# Patient Record
Sex: FEMALE | Race: WHITE | Employment: OTHER | URBAN - METROPOLITAN AREA
[De-identification: names, ages, dates, MRNs, and addresses within clinical notes are randomized per-mention and may not be internally consistent; named-entity substitution may affect disease eponyms.]

---

## 2017-01-06 RX ORDER — POTASSIUM CHLORIDE 20 MEQ/1
60 TABLET, EXTENDED RELEASE ORAL DAILY
Qty: 90 TAB | Refills: 6 | Status: SHIPPED | OUTPATIENT
Start: 2017-01-06 | End: 2017-12-28 | Stop reason: SDUPTHER

## 2017-02-01 ENCOUNTER — OFFICE VISIT (OUTPATIENT)
Dept: CARDIOLOGY CLINIC | Age: 57
End: 2017-02-01

## 2017-02-01 DIAGNOSIS — I44.2 COMPLETE HEART BLOCK (HCC): Primary | ICD-10-CM

## 2017-02-01 DIAGNOSIS — Z95.0 PACEMAKER: ICD-10-CM

## 2017-02-06 NOTE — PROGRESS NOTES
I have personally seen and evaluated the device findings. Interrogation reviewed and I agree with assessment.     Nataliia Knows

## 2017-03-21 ENCOUNTER — OFFICE VISIT (OUTPATIENT)
Dept: CARDIOLOGY CLINIC | Age: 57
End: 2017-03-21

## 2017-03-21 VITALS
BODY MASS INDEX: 29.26 KG/M2 | HEART RATE: 75 BPM | SYSTOLIC BLOOD PRESSURE: 128 MMHG | DIASTOLIC BLOOD PRESSURE: 60 MMHG | HEIGHT: 62 IN | WEIGHT: 159 LBS | OXYGEN SATURATION: 95 %

## 2017-03-21 DIAGNOSIS — I35.0 SEVERE AORTIC STENOSIS: Primary | ICD-10-CM

## 2017-03-21 DIAGNOSIS — R60.0 BILATERAL LOWER EXTREMITY EDEMA: ICD-10-CM

## 2017-03-21 DIAGNOSIS — I48.20 CHRONIC ATRIAL FIBRILLATION (HCC): ICD-10-CM

## 2017-03-21 DIAGNOSIS — I27.20 PULMONARY HYPERTENSION (HCC): ICD-10-CM

## 2017-03-21 DIAGNOSIS — I50.32 CHRONIC DIASTOLIC HEART FAILURE (HCC): ICD-10-CM

## 2017-03-21 DIAGNOSIS — I25.10 CAD IN NATIVE ARTERY: ICD-10-CM

## 2017-03-21 DIAGNOSIS — Z95.0 PACEMAKER: ICD-10-CM

## 2017-03-21 RX ORDER — ASPIRIN 81 MG/1
81 TABLET ORAL
COMMUNITY

## 2017-03-21 NOTE — PROGRESS NOTES
HISTORY OF PRESENT ILLNESS  Efren Sanchez is a 55 y.o. female. HPI  She has been feeling quite well lately. She has no cardiac complaints at this time. She has only minimal dyspnea on exertion. She denies chest pain, resting dyspnea, orthopnea or PND. She has had no palpitations despite the atrial fibrillation. She denies dizziness or syncope. She has had no symptoms to indicate TIA or amaurosis fugax. She had a followup echocardiogram on 02/21/2017, which demonstrated normal LV function with EF in the 75% range. The left atrium was dilated with a volume index of 37 mL/meter2. The bioprosthetic aortic valve appears to be functioning normally with the mean gradient of 14 mmHg. There appears to be no significant perivalvular leak. PA pressure was estimated at 49 mmHg. She has an extensive cardiac history. She had her first aortic valve replacement with the bioprosthetic aortic valve in July of 2006. 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 She subsequently underwent redo aortic valve replacement by TAVR successfully. Her repeat echocardiogram in June of 2016 demonstrated a normal LV functioning bioprosthetic aortic valve with a mean gradient of 16 mmHg and no evidence of significant paravalvular leak. She is known to have had diastolic dysfunction of the left ventricle, which was documented by cardiac catheterization with a high LVEDP. Her coronary artery system was patent with minor diffuse changes with no obstructive lesion by cardiac catheterization. She has developed severe pulmonary hypertension, which apparently preceded the aortic valve replacement. PA pressure was up to 90 mmHg in the past, which decreased to 50 mmHg on 06/28/16 by echocardiogram, at which time, the ejection fraction of the left ventricle was in the 60% range.  The left atrium was moderately dilated and the right atrium was dilated as well. There was mild annular calcification of the mitral valve with mild regurgitation. The mean pressure gradient over the aortic valve was 17 mmHg. There was mild to moderate tricuspid regurgitation. She has had chronic atrial fibrillation and had significant bradycardia and underwent pacemaker implantation in 2013 because of the complete heart block. She also has an extensive noncardiac history that includes malignant melanoma that was treated with surgery, chemotherapy and radiation therapy in 2004, which resulted in lymphedema in the left arm. She is known to have had restrictive lung disease of unknown etiology. She has had hypertension, diabetes and severe dyslipidemia with extremely high triglycerides, low HDL and high total cholesterol. Cholesterol level was 304 with LDL of 39 and triglycerides of 521 in May of 2016. She has some history of renal dysfunction with over-diuresis in the past.    Review of Systems   Constitutional: Negative for malaise/fatigue and weight loss. HENT: Negative for hearing loss. Eyes: Negative for blurred vision and double vision. Respiratory: Negative for shortness of breath. Cardiovascular: Positive for leg swelling. Negative for chest pain, palpitations, orthopnea, claudication and PND. Gastrointestinal: Negative for blood in stool, heartburn and melena. Genitourinary: Negative for dysuria, hematuria and urgency. Musculoskeletal: Negative for back pain and joint pain. Skin: Negative for itching and rash. Neurological: Negative for dizziness, loss of consciousness, weakness and headaches. Psychiatric/Behavioral: Negative for depression and memory loss. Physical Exam   Constitutional: She is oriented to person, place, and time. She appears well-developed and well-nourished. HENT:   Head: Normocephalic and atraumatic. Eyes: Conjunctivae are normal. Pupils are equal, round, and reactive to light. Neck: Normal range of motion. Neck supple. No JVD present. Cardiovascular: Normal rate, regular rhythm, S1 normal and S2 normal.   No extrasystoles are present. PMI is not displaced. Exam reveals no gallop and no friction rub. Murmur heard. Harsh early systolic murmur is present with a grade of 2/6  at the upper right sternal border radiating to the neck  Pulses:       Carotid pulses are 3+ on the right side with bruit, and 3+ on the left side with bruit. Pulmonary/Chest: Effort normal. She has no rales. Abdominal: Soft. There is no tenderness. Musculoskeletal: She exhibits edema. Lymphedema,no blisters   Neurological: She is alert and oriented to person, place, and time. No cranial nerve deficit. Skin: Skin is warm and dry. Psychiatric: She has a normal mood and affect. Her behavior is normal.     Visit Vitals    /60    Pulse 75    Ht 5' 2\" (1.575 m)    Wt 72.1 kg (159 lb)    SpO2 95%    BMI 29.08 kg/m2       Past Medical History:   Diagnosis Date    Aortic stenosis     #23 bioprosthetic AVR 7/06    Atrial fibrillation ([facility])     CHADS score 2  (-CHF, +HTN, -AGE, +DM, -CVA)    CAD in native artery     cor angio (Jan 2016): 50% pLAD, LCx luminal irreg, 30% mRCA. pLAD MLA 12.7 (IVUS)    Chronic diastolic HF (heart failure) ([facility])     LV EF 55% (echo Nov 2015)    Complete heart block [facility])     periprocedural 7/06    Diabetes     Dyslipidemia     Femoral arteriovenous fistula, right ([facility])     R side with initially ? pseudoaneurysm, none on last PVL (Feb 11, 2016 @ [facility])    History of echocardiogram 10/13/2015    EF 55%. No WMA. Mild LVH. Indeterminate diastolic function. Dyssynergic septal motion.  RVE. RV systolic fx reduced, more in apical region. RVSP 60-70 mmHg (prev 50 mmHg on study of 3/19/14). Mod LAE.  MIC. Mild MR. Bioprosthetic AV w/mild-mod AS and mild-mod AI (mean grad 28 mmHg). Mod TR. Mild-mod PI. Mod IVCE. Poor inspiratory collapse.     History of exercise stress test 02/15/2006 Nondiagnostic stress test due to inability to attain diagnostic HR. Ex time 1 min 43 sec.  Hypertension     Hypothyroidism     Malignant melanoma ([address]     initial '95, recurrent '04, L supraclavicullar/axillary resection '04,    Malignant melanoma ([facility]     neck reconstruction, L rectus free tissue transfer '04    Malignant melanoma ([address]     chemo, XRT    Obstructive sleep apnea     severe, CPAP    Pacemaker     7/06 MEDTRONIC    Pulmonary hypertension ([facility]     cath (2006, prior to AVR): RA 20, RV 86/10, PA 83/33, PCWP 36. CO 3.3 (Zach)    Restrictive lung disease     moderate (PFTs 11/04)    S/P cardiac cath 03/06/2006    Patent coronary arteries. LVEDP 32 mmHg. EF 50-55%. No WMA. RA 20.  RV 86/10. PA 83/33. W 36.  CO 3.3 (Donn Kappa). Severe AS. Severe pulm hypertension.  SVT     periprocedural herniorrhaphy 10/04       Social History     Social History    Marital status:      Spouse name: N/A    Number of children: N/A    Years of education: N/A     Occupational History    Not on file.      Social History Main Topics    Smoking status: Never Smoker    Smokeless tobacco: Never Used    Alcohol use Yes      Comment: rarely    Drug use: Not on file    Sexual activity: Not on file     Other Topics Concern    Not on file     Social History Narrative       Family History   Problem Relation Age of Onset    Elevated Lipids Mother     Heart Disease Mother     Diabetes Father     Heart Disease Father     Elevated Lipids Father     Hypertension Father     Cancer Father     Heart Disease Brother        Past Surgical History:   Procedure Laterality Date    HX AORTIC VALVE REPLACEMENT      #23 pericardial 7/06    HX HERNIA REPAIR      10/04 ventral herniorrhaphy    HX PACEMAKER      7/06    ND RADICAL RESEC TUMOR, NECK/CHEST      melanoma resection       Current Outpatient Prescriptions   Medication Sig Dispense Refill    aspirin delayed-release 81 mg tablet 81 mg.     Juan Ness potassium chloride (K-DUR, KLOR-CON) 20 mEq tablet Take 3 Tabs by mouth daily. 90 Tab 6    atorvastatin (LIPITOR) 80 mg tablet Take 1 Tab by mouth daily. 30 Tab 6    carvedilol (COREG) 3.125 mg tablet Take 1 Tab by mouth two (2) times daily (with meals). 60 Tab 6    metOLazone (ZAROXOLYN) 5 mg tablet Take 1 Tab by mouth two (2) times a day. 60 Tab 6    omega-3 fatty acids-vitamin e (FISH OIL) 1,000 mg cap Take 3 Caps by mouth daily. 90 Cap 6    bumetanide (BUMEX) 2 mg tablet TAKE ONE & ONE-HALF TABLETS BY MOUTH TWICE DAILY 90 Tab 6    sildenafil (REVATIO) 20 mg tablet Take 1 Tab by mouth three (3) times daily. 90 Tab 6    glimepiride (AMARYL) 4 mg tablet Take 2 mg by mouth two (2) times a day.  spironolactone (ALDACTONE) 25 mg tablet Take 1 Tab by mouth two (2) times a day. 60 Tab 6    magnesium oxide (MAG-OX) 400 mg tablet Take 1 Tab by mouth three (3) times daily. 90 Tab 11    rivaroxaban (XARELTO) 15 mg tab tablet Take  by mouth daily.  levothyroxine (SYNTHROID) 125 mcg tablet Take 1 Tab by mouth daily (before breakfast). 1 Tab 0       EKG: unchanged from previous tracings, atrial fibrillation, rate controlled, 1:1 VVI pacer activities  . ASSESSMENT and PLAN  Encounter Diagnoses   Name Primary?  Severe aortic stenosis, S/P AVR 2006. Redo Left TF TAVR with a Medtronic CoreValve Evolut R 26 mm 2/16/16 Yes    Pulmonary hypertension (HCC)     Chronic diastolic heart failure (HCC)     Chronic atrial fibrillation (HCC)     Pacemaker     Chronic bilateral lower extremity edema     CAD in native artery,nonobstructive, 50% prox LAD    She has been doing quite well. Her bioprosthetic aortic valve appears to be functioning normally. Her recent echocardiogram demonstrated normal functioning aortic valve with no evidence of perivalvular leak. PA pressure remains at around 50 mmHg, which is an improvement from 90 mmHg prior to the TAVR.   The etiology of her aortic stenosis at the beginning was presumably secondary to radiation for the melanoma or possibly congenital.  She does have mild nonobstructive coronary artery disease and this needs to be followed closely. She needs to work on risk factor modification. I do not have her most recent cholesterol profile and this needs to be followed closely in the future, but currently, she appears to be on an adequate dose of atorvastatin at 80 mg daily. Her atrial fibrillation has been stable with good rate control and currently, she is 100% pacemaker dependent. She will be continued on Xarelto since her atrial fibrillation could be considered nonvalvular atrial fibrillation since the mitral valve is not the cause of her atrial fibrillation.

## 2017-03-21 NOTE — MR AVS SNAPSHOT
Visit Information Date & Time Provider Department Dept. Phone Encounter #  
 3/21/2017  1:40 PM Vick Wolff MD [facility] [phone] [id number] Your Appointments 5/11/2017  9:15 AM  
PROCEDURE with Pacer Toby Quezadai [facility] ([address]) Appt Note: annual  
 [address] [place] [address] [phone] [address] [place] [address] [address] Upcoming Health Maintenance Date Due Hepatitis C Screening 1960 FOOT EXAM Q1 6/12/1971 EYE EXAM RETINAL OR DILATED Q1 6/12/1971 Pneumococcal 19-64 Highest Risk (1 of 3 - PCV13) 6/12/1980 DTaP/Tdap/Td series (1 - Tdap) 6/12/1982 PAP AKA CERVICAL CYTOLOGY 6/12/1982 BREAST CANCER SCRN MAMMOGRAM 6/12/2011 FOBT Q 1 YEAR AGE 50-75 6/12/2011 INFLUENZA AGE 9 TO ADULT 8/1/2016 HEMOGLOBIN A1C Q6M 11/23/2016 MICROALBUMIN Q1 5/23/2017 LIPID PANEL Q1 5/23/2017 Allergies as of 3/21/2017  Review Complete On: 3/21/2017 By: Vick Wolff MD  
  
 Severity Noted Reaction Type Reactions Adhesive  04/06/2016    Itching Pradaxa [Dabigatran Etexilate]    Other (comments) GI distress Current Immunizations  Reviewed on 1/5/2016 No immunizations on file. Not reviewed this visit You Were Diagnosed With   
  
 Codes Comments Pulmonary hypertension ([facility]    -  Primary ICD-10-CM: I27.2 ICD-9-CM: 416.8 Chronic diastolic heart failure (HCC)     ICD-10-CM: I50.32 
ICD-9-CM: 428.32 Chronic atrial fibrillation (HCC)     ICD-10-CM: T61.2 ICD-9-CM: 427.31 Severe aortic stenosis     ICD-10-CM: I35.0 ICD-9-CM: 424.1 Pacemaker     ICD-10-CM: Z95.0 ICD-9-CM: V45.01 Bilateral lower extremity edema     ICD-10-CM: R60.0 ICD-9-CM: 035. 3 Vitals BP Pulse Height(growth percentile) Weight(growth percentile) SpO2 BMI 128/60 75 5' 2\" (1.575 m) 159 lb (72.1 kg) 95% 29.08 kg/m2 OB Status Smoking Status Postmenopausal Never Smoker BMI and BSA Data Body Mass Index Body Surface Area 29.08 kg/m 2 1.78 m 2 Preferred Pharmacy Pharmacy Name Phone [facility], [address] [phone] Your Updated Medication List  
  
   
This list is accurate as of: 3/21/17  3:13 PM.  Always use your most recent med list.  
  
  
  
  
 aspirin delayed-release 81 mg tablet 81 mg.  
  
 atorvastatin 80 mg tablet Commonly known as:  LIPITOR Take 1 Tab by mouth daily. bumetanide 2 mg tablet Commonly known as:  [place] TAKE ONE & ONE-HALF TABLETS BY MOUTH TWICE DAILY  
  
 carvedilol 3.125 mg tablet Commonly known as:  Catrachita Stacey Take 1 Tab by mouth two (2) times daily (with meals). glimepiride 4 mg tablet Commonly known as:  AMARYL Take 2 mg by mouth two (2) times a day. levothyroxine 125 mcg tablet Commonly known as:  SYNTHROID Take 1 Tab by mouth daily (before breakfast). magnesium oxide 400 mg tablet Commonly known as:  MAG-OX Take 1 Tab by mouth three (3) times daily. metOLazone 5 mg tablet Commonly known as:  [place] Take 1 Tab by mouth two (2) times a day. omega-3 fatty acids-vitamin e 1,000 mg Cap Commonly known as:  FISH OIL Take 3 Caps by mouth daily. potassium chloride 20 mEq tablet Commonly known as:  K-DUR, KLOR-CON Take 3 Tabs by mouth daily. rivaroxaban 15 mg Tab tablet Commonly known as:  Renato Antes Take  by mouth daily. sildenafil 20 mg tablet Commonly known as:  REVATIO Take 1 Tab by mouth three (3) times daily. spironolactone 25 mg tablet Commonly known as:  ALDACTONE Take 1 Tab by mouth two (2) times a day. Introducing [facility]!    
 [facility] introduces [email or web address] patient portal. Now you can access parts of your medical record, email your doctor's office, and request medication refills online. 1. In your internet browser, go to [email or web address] 2. Click on the First Time User? Click Here link in the Sign In box. You will see the New Member Sign Up page. 3. Enter your [employer] Access Code exactly as it appears below. You will not need to use this code after youve completed the sign-up process. If you do not sign up before the expiration date, you must request a new code. · [employer] Access Code: [email or web address] Expires: 6/19/2017  3:13 PM 
 
4. Enter the last four digits of your Social Security Number (xxxx) and Date of Birth (mm/dd/yyyy) as indicated and click Submit. You will be taken to the next sign-up page. 5. Create a [employer] ID. This will be your [employer] login ID and cannot be changed, so think of one that is secure and easy to remember. 6. Create a [employer] password. You can change your password at any time. 7. Enter your Password Reset Question and Answer. This can be used at a later time if you forget your password. 8. Enter your e-mail address. You will receive e-mail notification when new information is available in [address] 9. Click Sign Up. You can now view and download portions of your medical record. 10. Click the Download Summary menu link to download a portable copy of your medical information. If you have questions, please visit the Frequently Asked Questions section of the [employer] website. Remember, [employer] is NOT to be used for urgent needs. For medical emergencies, dial 911. Now available from your iPhone and Android! Please provide this summary of care documentation to your next provider. Your primary care clinician is listed as PRAKASH LOMBARDI. If you have any questions after today's visit, please call [phone].

## 2017-03-21 NOTE — PROGRESS NOTES
1. Have you been to the ER, urgent care clinic since your last visit? Hospitalized since your last visit? no  2. Have you seen or consulted any other health care providers outside of the [address] [place] [address] since your last visit? Include any pap smears or colon screening.   Dr. Fadumo Moreno 2/17/17 Echo/ekg done

## 2017-03-22 DIAGNOSIS — E78.5 DYSLIPIDEMIA: ICD-10-CM

## 2017-03-22 DIAGNOSIS — I48.20 CHRONIC ATRIAL FIBRILLATION (HCC): ICD-10-CM

## 2017-03-22 DIAGNOSIS — I50.30: ICD-10-CM

## 2017-03-22 DIAGNOSIS — I38: ICD-10-CM

## 2017-03-22 DIAGNOSIS — I25.10 CAD IN NATIVE ARTERY: Primary | ICD-10-CM

## 2017-03-22 DIAGNOSIS — E13.9 DIABETES MELLITUS OF OTHER TYPE WITHOUT COMPLICATION: ICD-10-CM

## 2017-03-30 RX ORDER — LANOLIN ALCOHOL/MO/W.PET/CERES
400 CREAM (GRAM) TOPICAL 3 TIMES DAILY
Qty: 90 TAB | Refills: 11 | Status: CANCELLED | OUTPATIENT
Start: 2017-03-30

## 2017-03-31 RX ORDER — LANOLIN ALCOHOL/MO/W.PET/CERES
400 CREAM (GRAM) TOPICAL 3 TIMES DAILY
Qty: 90 TAB | Refills: 11 | Status: SHIPPED | OUTPATIENT
Start: 2017-03-31 | End: 2017-04-03 | Stop reason: SDUPTHER

## 2017-04-03 RX ORDER — LEVOTHYROXINE SODIUM 125 UG/1
125 TABLET ORAL
Qty: 30 TAB | Refills: 0 | Status: SHIPPED | OUTPATIENT
Start: 2017-04-03 | End: 2018-05-09 | Stop reason: SDUPTHER

## 2017-04-03 RX ORDER — LANOLIN ALCOHOL/MO/W.PET/CERES
400 CREAM (GRAM) TOPICAL 3 TIMES DAILY
Qty: 90 TAB | Refills: 11 | Status: SHIPPED | OUTPATIENT
Start: 2017-04-03 | End: 2018-04-25 | Stop reason: SDUPTHER

## 2017-04-19 RX ORDER — BUMETANIDE 2 MG/1
TABLET ORAL
Qty: 90 TAB | Refills: 6 | Status: SHIPPED | OUTPATIENT
Start: 2017-04-19 | End: 2018-03-07 | Stop reason: SDUPTHER

## 2017-05-01 RX ORDER — SILDENAFIL CITRATE 20 MG/1
20 TABLET ORAL 3 TIMES DAILY
Qty: 90 TAB | Refills: 6 | Status: SHIPPED | OUTPATIENT
Start: 2017-05-01 | End: 2018-04-02 | Stop reason: SDUPTHER

## 2017-05-04 ENCOUNTER — HOSPITAL ENCOUNTER (OUTPATIENT)
Dept: LAB | Age: 57
Discharge: HOME | End: 2017-05-04
Payer: MEDICARE

## 2017-05-04 DIAGNOSIS — I25.10 CAD IN NATIVE ARTERY: ICD-10-CM

## 2017-05-04 DIAGNOSIS — I50.30: ICD-10-CM

## 2017-05-04 DIAGNOSIS — I48.20 CHRONIC ATRIAL FIBRILLATION (HCC): ICD-10-CM

## 2017-05-04 DIAGNOSIS — I38: ICD-10-CM

## 2017-05-04 DIAGNOSIS — E78.5 DYSLIPIDEMIA: ICD-10-CM

## 2017-05-04 DIAGNOSIS — E13.9 DIABETES MELLITUS OF OTHER TYPE WITHOUT COMPLICATION: ICD-10-CM

## 2017-05-04 LAB
ALBUMIN SERPL BCP-MCNC: 1.8 G/DL (ref 3.4–5)
ALBUMIN/GLOB SERPL: 0.6 {RATIO} (ref 0.8–1.7)
ALP SERPL-CCNC: 238 U/L (ref 45–117)
ALT SERPL-CCNC: 19 U/L (ref 13–56)
ANION GAP BLD CALC-SCNC: 9 MMOL/L (ref 3–18)
AST SERPL W P-5'-P-CCNC: 22 U/L (ref 15–37)
BILIRUB SERPL-MCNC: 0.5 MG/DL (ref 0.2–1)
BUN SERPL-MCNC: 47 MG/DL (ref 7–18)
BUN/CREAT SERPL: 44 (ref 12–20)
CALCIUM SERPL-MCNC: 7 MG/DL (ref 8.5–10.1)
CHLORIDE SERPL-SCNC: 97 MMOL/L (ref 100–108)
CHOLEST SERPL-MCNC: 332 MG/DL
CO2 SERPL-SCNC: 29 MMOL/L (ref 21–32)
CREAT SERPL-MCNC: 1.06 MG/DL (ref 0.6–1.3)
GLOBULIN SER CALC-MCNC: 2.9 G/DL (ref 2–4)
GLUCOSE SERPL-MCNC: 212 MG/DL (ref 74–99)
HDLC SERPL-MCNC: 40 MG/DL (ref 40–60)
HDLC SERPL: 8.3 {RATIO} (ref 0–5)
LDLC SERPL CALC-MCNC: ABNORMAL MG/DL (ref 0–100)
LIPID PROFILE,FLP: ABNORMAL
POTASSIUM SERPL-SCNC: 3.4 MMOL/L (ref 3.5–5.5)
PROT SERPL-MCNC: 4.7 G/DL (ref 6.4–8.2)
SODIUM SERPL-SCNC: 135 MMOL/L (ref 136–145)
TRIGL SERPL-MCNC: 926 MG/DL (ref ?–150)
VLDLC SERPL CALC-MCNC: ABNORMAL MG/DL

## 2017-05-04 PROCEDURE — 80053 COMPREHEN METABOLIC PANEL: CPT | Performed by: INTERNAL MEDICINE

## 2017-05-04 PROCEDURE — 80061 LIPID PANEL: CPT | Performed by: INTERNAL MEDICINE

## 2017-05-04 PROCEDURE — 36415 COLL VENOUS BLD VENIPUNCTURE: CPT | Performed by: INTERNAL MEDICINE

## 2017-05-10 DIAGNOSIS — E78.5 DYSLIPIDEMIA: Primary | ICD-10-CM

## 2017-05-23 RX ORDER — EZETIMIBE 10 MG/1
10 TABLET ORAL DAILY
Qty: 30 TAB | Refills: 6 | Status: SHIPPED | OUTPATIENT
Start: 2017-05-23 | End: 2018-11-26 | Stop reason: ALTCHOICE

## 2017-05-23 NOTE — TELEPHONE ENCOUNTER
Robinson Souza MD   You 7 days ago                 Add Elida (Routing comment)           Lm on AM for patient to call back

## 2017-06-23 RX ORDER — CARVEDILOL 3.12 MG/1
3.12 TABLET ORAL 2 TIMES DAILY WITH MEALS
Qty: 60 TAB | Refills: 6 | Status: SHIPPED | OUTPATIENT
Start: 2017-06-23 | End: 2018-01-29 | Stop reason: SDUPTHER

## 2017-06-28 DIAGNOSIS — M79.641 PAIN OF RIGHT HAND: Primary | ICD-10-CM

## 2017-06-28 NOTE — PROGRESS NOTES
Per Dr. Mike Forrest, order Uric Acid Lab for right hand pain.        Verbal order and read back per Willian Valadez MD

## 2017-06-29 ENCOUNTER — HOSPITAL ENCOUNTER (OUTPATIENT)
Dept: LAB | Age: 57
Discharge: HOME | End: 2017-06-29
Payer: MEDICARE

## 2017-06-29 DIAGNOSIS — M79.641 PAIN OF RIGHT HAND: ICD-10-CM

## 2017-06-29 LAB — URATE SERPL-MCNC: 11.5 MG/DL (ref 2.6–7.2)

## 2017-06-29 PROCEDURE — 36415 COLL VENOUS BLD VENIPUNCTURE: CPT | Performed by: INTERNAL MEDICINE

## 2017-06-29 PROCEDURE — 84550 ASSAY OF BLOOD/URIC ACID: CPT | Performed by: INTERNAL MEDICINE

## 2017-06-30 RX ORDER — ALLOPURINOL 100 MG/1
100 TABLET ORAL DAILY
Qty: 30 TAB | Refills: 6 | Status: SHIPPED | OUTPATIENT
Start: 2017-06-30 | End: 2018-01-30 | Stop reason: SDUPTHER

## 2017-06-30 RX ORDER — COLCHICINE 0.6 MG/1
0.6 TABLET ORAL DAILY
Qty: 30 TAB | Refills: 6 | Status: SHIPPED | OUTPATIENT
Start: 2017-06-30 | End: 2018-01-26 | Stop reason: SDUPTHER

## 2017-07-20 DIAGNOSIS — I11.9 BENIGN HYPERTENSIVE HEART DISEASE WITHOUT HEART FAILURE: Primary | ICD-10-CM

## 2017-07-20 RX ORDER — METOLAZONE 5 MG/1
TABLET ORAL
Qty: 60 TAB | Refills: 6 | Status: SHIPPED | OUTPATIENT
Start: 2017-07-20 | End: 2018-08-22 | Stop reason: SDUPTHER

## 2017-07-20 NOTE — TELEPHONE ENCOUNTER
Spoke with Dr. Mateo Desai. Ok to have refilled, have BMP drawn. Verbal order and read back per Reddy Nicholas MD  Pt states the soonest she can have drawn is Monday or Tuesday due to transportation issues.

## 2017-07-25 ENCOUNTER — HOSPITAL ENCOUNTER (OUTPATIENT)
Dept: LAB | Age: 57
Discharge: HOME | End: 2017-07-25
Payer: MEDICARE

## 2017-07-25 DIAGNOSIS — E78.5 DYSLIPIDEMIA: ICD-10-CM

## 2017-07-25 LAB
ALBUMIN SERPL BCP-MCNC: 2.4 G/DL (ref 3.4–5)
ALBUMIN/GLOB SERPL: 0.8 {RATIO} (ref 0.8–1.7)
ALP SERPL-CCNC: 200 U/L (ref 45–117)
ALT SERPL-CCNC: 39 U/L (ref 13–56)
AST SERPL W P-5'-P-CCNC: 31 U/L (ref 15–37)
BILIRUB DIRECT SERPL-MCNC: <0.1 MG/DL (ref 0–0.2)
BILIRUB SERPL-MCNC: 0.5 MG/DL (ref 0.2–1)
CHOLEST SERPL-MCNC: 323 MG/DL
GLOBULIN SER CALC-MCNC: 3.2 G/DL (ref 2–4)
HDLC SERPL-MCNC: 30 MG/DL (ref 40–60)
HDLC SERPL: 10.8 {RATIO} (ref 0–5)
LDLC SERPL CALC-MCNC: ABNORMAL MG/DL (ref 0–100)
LIPID PROFILE,FLP: ABNORMAL
PROT SERPL-MCNC: 5.6 G/DL (ref 6.4–8.2)
TRIGL SERPL-MCNC: 1800 MG/DL (ref ?–150)
VLDLC SERPL CALC-MCNC: ABNORMAL MG/DL

## 2017-07-25 PROCEDURE — 36415 COLL VENOUS BLD VENIPUNCTURE: CPT | Performed by: INTERNAL MEDICINE

## 2017-07-25 PROCEDURE — 80076 HEPATIC FUNCTION PANEL: CPT | Performed by: INTERNAL MEDICINE

## 2017-07-25 PROCEDURE — 80061 LIPID PANEL: CPT | Performed by: INTERNAL MEDICINE

## 2017-08-30 RX ORDER — ATORVASTATIN CALCIUM 80 MG/1
80 TABLET, FILM COATED ORAL DAILY
Qty: 30 TAB | Refills: 6 | Status: SHIPPED | OUTPATIENT
Start: 2017-08-30 | End: 2018-07-13 | Stop reason: SDUPTHER

## 2017-09-26 ENCOUNTER — CLINICAL SUPPORT (OUTPATIENT)
Dept: CARDIOLOGY CLINIC | Age: 57
End: 2017-09-26

## 2017-09-26 ENCOUNTER — OFFICE VISIT (OUTPATIENT)
Dept: CARDIOLOGY CLINIC | Age: 57
End: 2017-09-26

## 2017-09-26 VITALS
HEART RATE: 66 BPM | DIASTOLIC BLOOD PRESSURE: 62 MMHG | OXYGEN SATURATION: 94 % | BODY MASS INDEX: 27.23 KG/M2 | SYSTOLIC BLOOD PRESSURE: 98 MMHG | HEIGHT: 62 IN | WEIGHT: 148 LBS

## 2017-09-26 DIAGNOSIS — Z95.0 PACEMAKER: ICD-10-CM

## 2017-09-26 DIAGNOSIS — Z95.2 S/P AVR: Primary | ICD-10-CM

## 2017-09-26 DIAGNOSIS — I48.20 CHRONIC ATRIAL FIBRILLATION (HCC): ICD-10-CM

## 2017-09-26 DIAGNOSIS — R80.9 PROTEINURIA, UNSPECIFIED TYPE: ICD-10-CM

## 2017-09-26 DIAGNOSIS — I27.20 PULMONARY HYPERTENSION (HCC): ICD-10-CM

## 2017-09-26 DIAGNOSIS — I25.10 CAD IN NATIVE ARTERY: ICD-10-CM

## 2017-09-26 DIAGNOSIS — I44.2 COMPLETE HEART BLOCK (HCC): Primary | ICD-10-CM

## 2017-09-26 DIAGNOSIS — E78.5 DYSLIPIDEMIA: ICD-10-CM

## 2017-09-26 NOTE — PROGRESS NOTES
HISTORY OF PRESENT ILLNESS  Katt Guzman is a 65 y.o. female. HPI  Overall, she has been feeling reasonably well. She has no new complaints. She continues with massive left arm lymphedema. Her leg edema has been minimal.  She denies chest pain, resting dyspnea, orthopnea or PND. She has minimal dyspnea on exertion. She denies palpitations, dizziness or syncope. She has never felt palpitations despite the atrial fibrillation. She has had no symptoms to indicate TIA or amaurosis fugax. She has a very low serum albumin level with a level of 1.8 in May of 2017 and 2.4 in July of 2017. Her cholesterol profile in July of 2017 included a total cholesterol of 323, triglycerides of 1,800, HDL of 30 and uncalculated LDL. She has not been able to afford many medications, including Zetia. She has history of chemotherapy and extensive radiation treatment for melanoma, which may have resulted in the severe aortic stenosis in the beginning and perhaps some fibrotic lung disease as well along with the massive left arm lymphedema. She has an extensive cardiac history. She had her first aortic valve replacement with the bioprosthetic aortic valve in July of 2006. 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 She subsequently underwent redo aortic valve replacement by TAVR successfully. Her repeat echocardiogram in June of 2016 demonstrated a normal LV functioning bioprosthetic aortic valve with a mean gradient of 16 mmHg and no evidence of significant paravalvular leak. She is known to have had diastolic dysfunction of the left ventricle, which was documented by cardiac catheterization with a high LVEDP. Her coronary artery system was patent with minor diffuse changes with no obstructive lesion by cardiac catheterization.  She has developed severe pulmonary hypertension, which apparently preceded the aortic valve replacement. PA pressure was up to 90 mmHg in the past, which decreased to 50 mmHg on 06/28/16 by echocardiogram, at which time, the ejection fraction of the left ventricle was in the 60% range. The left atrium was moderately dilated and the right atrium was dilated as well. There was mild annular calcification of the mitral valve with mild regurgitation. The mean pressure gradient over the aortic valve was 17 mmHg. There was mild to moderate tricuspid regurgitation. She has had chronic atrial fibrillation and had significant bradycardia and underwent pacemaker implantation in 2013 because of the complete heart block. She also has an extensive noncardiac history that includes malignant melanoma that was treated with surgery, chemotherapy and radiation therapy in 2004, which resulted in lymphedema in the left arm. She is known to have had restrictive lung disease of unknown etiology. She has had hypertension, diabetes and severe dyslipidemia with extremely high triglycerides, low HDL and high total cholesterol. Cholesterol level was 304 with LDL of 39 and triglycerides of 521 in May of 2016. She has some history of renal dysfunction with over-diuresis in the past.  She had a followup echocardiogram on 02/21/2017, which demonstrated normal LV function with EF in the 75% range. The left atrium was dilated with a volume index of 37 mL/meter2. The bioprosthetic aortic valve appears to be functioning normally with the mean gradient of 14 mmHg. There appears to be no significant perivalvular leak. PA pressure was estimated at 49 mmHg. Review of Systems   Constitutional: Negative for malaise/fatigue and weight loss. HENT: Negative for hearing loss. Eyes: Negative for blurred vision and double vision. Respiratory: Negative for shortness of breath. Cardiovascular: Positive for leg swelling. Negative for chest pain, palpitations, claudication and PND.    Gastrointestinal: Negative for blood in stool, heartburn and melena. Genitourinary: Negative for dysuria, frequency, hematuria and urgency. Musculoskeletal: Negative for back pain and joint pain. Skin: Negative for itching and rash. Neurological: Negative for dizziness, loss of consciousness and weakness. Psychiatric/Behavioral: Negative for depression and memory loss. Physical Exam   Constitutional: She is oriented to person, place, and time. She appears well-developed and well-nourished. HENT:   Head: Normocephalic and atraumatic. Eyes: Conjunctivae are normal. Pupils are equal, round, and reactive to light. Neck: Normal range of motion. Neck supple. No JVD present. Cardiovascular: Normal rate, regular rhythm, S1 normal and S2 normal.   No extrasystoles are present. PMI is not displaced. Exam reveals no gallop and no friction rub. Murmur heard. Harsh midsystolic murmur is present with a grade of 2/6  at the upper right sternal border radiating to the neck  Pulses:       Carotid pulses are 3+ on the right side with bruit, and 3+ on the left side with bruit. Pulmonary/Chest: Effort normal. She has no rales. Abdominal: Soft. There is no tenderness. Musculoskeletal: She exhibits edema. Lymphedema in Lt. arm,trace edema in legs   Neurological: She is alert and oriented to person, place, and time. No cranial nerve deficit. Skin: Skin is warm and dry. Psychiatric: She has a normal mood and affect. Visit Vitals    BP 98/62 (BP 1 Location: Right arm, BP Patient Position: Sitting)    Pulse 66    Ht 5' 2\" (1.575 m)    Wt 67.1 kg (148 lb)    SpO2 94%    BMI 27.07 kg/m2       Past Medical History:   Diagnosis Date    Aortic stenosis     #23 bioprosthetic AVR 7/06    Atrial fibrillation (HCC)     CHADS score 2  (-CHF, +HTN, -AGE, +DM, -CVA)    CAD in native artery     cor angio (Jan 2016): 50% pLAD, LCx luminal irreg, 30% mRCA.  pLAD MLA 12.7 ([facility])    [facility] diastolic HF [facility]heart [facility])     LV EF 55% (echo Nov 2015)    Complete heart block [facility])     periprocedural 7/06    Diabetes     Dyslipidemia     Femoral arteriovenous fistula, right (HCC)     R side with initially ? pseudoaneurysm, none on last PVL (Feb 11, 2016 @ [facility])    History of echocardiogram 10/13/2015    EF 55%. No WMA. Mild LVH. Indeterminate diastolic function. Dyssynergic septal motion.  RVE. RV systolic fx reduced, more in apical region. RVSP 60-70 mmHg (prev 50 mmHg on study of 3/19/14). Mod LAE.  MIC. Mild MR. Bioprosthetic AV w/mild-mod AS and mild-mod AI (mean grad 28 mmHg). Mod TR. Mild-mod PI. Mod IVCE. Poor inspiratory collapse.  History of exercise stress test 02/15/2006    Nondiagnostic stress test due to inability to attain diagnostic HR. Ex time 1 min 43 sec.  Hypertension     Hypothyroidism     Malignant melanoma ([address]     initial '95, recurrent '04, L supraclavicullar/axillary resection '04,    Malignant melanoma ([address]     neck reconstruction, L rectus free tissue transfer '04    Malignant melanoma ([address]     chemo, XRT    Obstructive sleep apnea     severe, CPAP    Pacemaker     7/06 MEDTRONIC    Pulmonary hypertension ([address]     cath (2006, prior to AVR): RA 20, RV 86/10, PA 83/33, PCWP 36. CO 3.3 ([facility])    Restrictive lung disease     moderate (PFTs 11/04)    S/P cardiac cath 03/06/2006    Patent coronary arteries. LVEDP 32 mmHg. EF 50-55%. No WMA. RA 20.  RV 86/10. PA 83/33. W 36.  CO 3.3 (Jasson Cornea). Severe AS. Severe pulm hypertension.  SVT     periprocedural herniorrhaphy 10/04       Social History     Social History    Marital status:      Spouse name: N/A    Number of children: N/A    Years of education: N/A     Occupational History    Not on file.      Social History Main Topics    Smoking status: Never Smoker    Smokeless tobacco: Never Used    Alcohol use Yes      Comment: rarely    Drug use: Not on file    Sexual activity: Not on file     Other Topics Concern    Not on file     Social History Narrative       Family History   Problem Relation Age of Onset    Elevated Lipids Mother     Heart Disease Mother     Diabetes Father     Heart Disease Father     Elevated Lipids Father     Hypertension Father     Cancer Father     Heart Disease Brother        Past Surgical History:   Procedure Laterality Date    HX AORTIC VALVE REPLACEMENT      #23 pericardial 7/06    HX HERNIA REPAIR      10/04 ventral herniorrhaphy    HX PACEMAKER      7/06    UT RADICAL RESEC TUMOR, NECK/CHEST      melanoma resection       Current Outpatient Prescriptions   Medication Sig Dispense Refill    atorvastatin (LIPITOR) 80 mg tablet Take 1 Tab by mouth daily. 30 Tab 6    metOLazone (ZAROXOLYN) 5 mg tablet TAKE ONE TABLET BY MOUTH TWICE DAILY 60 Tab 6    allopurinol (ZYLOPRIM) 100 mg tablet Take 1 Tab by mouth daily. 30 Tab 6    colchicine 0.6 mg tablet Take 1 Tab by mouth daily. 30 Tab 6    carvedilol (COREG) 3.125 mg tablet Take 1 Tab by mouth two (2) times daily (with meals). 60 Tab 6    sildenafil (REVATIO) 20 mg tablet Take 1 Tab by mouth three (3) times daily. 90 Tab 6    bumetanide (BUMEX) 2 mg tablet TAKE ONE & ONE-HALF TABLETS BY MOUTH TWICE DAILY 90 Tab 6    levothyroxine (SYNTHROID) 125 mcg tablet Take 1 Tab by mouth Daily (before breakfast). 30 Tab 0    magnesium oxide (MAG-OX) 400 mg tablet Take 1 Tab by mouth three (3) times daily. 90 Tab 11    aspirin delayed-release 81 mg tablet 81 mg.  potassium chloride (K-DUR, KLOR-CON) 20 mEq tablet Take 3 Tabs by mouth daily. 90 Tab 6    omega-3 fatty acids-vitamin e (FISH OIL) 1,000 mg cap Take 3 Caps by mouth daily. 90 Cap 6    glimepiride (AMARYL) 4 mg tablet Take 2 mg by mouth two (2) times a day.  spironolactone (ALDACTONE) 25 mg tablet Take 1 Tab by mouth two (2) times a day. 60 Tab 6    rivaroxaban (XARELTO) 15 mg tab tablet Take  by mouth daily.  ezetimibe (ZETIA) 10 mg tablet Take 1 Tab by mouth daily.  30 Tab 6 EKG: unchanged from previous tracings, atrial fibrillation, rate controlled, 1:1 VVI pacer activities  . ASSESSMENT and PLAN  Encounter Diagnoses   Name Primary?  S/P AVR 2006. Redo Left TF TAVR with a Medtronic CoreValve Evolut R 26 mm 2/16/16 Yes    CAD in native artery     Proteinuria, unspecified type     Chronic atrial fibrillation ([address]     Pacemaker     Pulmonary hypertension ([address]     Dyslipidemia    Overall, she appears to be stable. Her bioprosthetic aortic valve appears to functioning adequately and shows no evidence of paravalvular leak. Her pulmonary hypertension has been stable with her PA pressure down to 49 mmHg in February of 2017 by echocardiogram.  It is quite possible that she developed severe aortic stenosis following the intense radiation for melanoma, which may have resulted in some fibrotic lung disease as well. She has been left with massive left arm lymphedema, which has not been resolving. It is unclear why she has a low albumin level and I will obtain a twenty-four hour urine for proteins to see if she has any significant degree of proteinuria. I will also obtain a prealbumin level to see if she has any issues with malabsorption or poor nutrition. Her lipid profile is extremely unsatisfactory with a tremendously high cholesterol level and triglycerides level. She has difficulties with obtaining medication because of the lack of drug coverage. I would like to see if she could be eligible for a drug assistance program for [facility]. Her atrial fibrillation has been persistent and chronic; therefore, I reprogrammed her pacemaker to VVI mode.

## 2017-09-26 NOTE — MR AVS SNAPSHOT
Visit Information Date & Time Provider Department Dept. Phone Encounter #  
 9/26/2017  2:00 PM Marychuy Argueta MD [facility] [phone] [id number] Upcoming Health Maintenance Date Due Hepatitis C Screening 1960 FOOT EXAM Q1 6/12/1971 EYE EXAM RETINAL OR DILATED Q1 6/12/1971 Pneumococcal 19-64 Highest Risk (1 of 3 - PCV13) 6/12/1980 DTaP/Tdap/Td series (1 - Tdap) 6/12/1982 PAP AKA CERVICAL CYTOLOGY 6/12/1982 BREAST CANCER SCRN MAMMOGRAM 6/12/2011 FOBT Q 1 YEAR AGE 50-75 6/12/2011 HEMOGLOBIN A1C Q6M 11/23/2016 MICROALBUMIN Q1 5/23/2017 INFLUENZA AGE 9 TO ADULT 8/1/2017 LIPID PANEL Q1 7/25/2018 Allergies as of 9/26/2017  Review Complete On: 9/26/2017 By: Marychuy Argueta MD  
  
 Severity Noted Reaction Type Reactions Adhesive  04/06/2016    Itching Pradaxa [Dabigatran Etexilate]    Other (comments) GI distress Current Immunizations  Reviewed on 1/5/2016 No immunizations on file. Not reviewed this visit You Were Diagnosed With   
  
 Codes Comments CAD in native artery    -  Primary ICD-10-CM: I25.10 ICD-9-CM: 414.01 Proteinuria, unspecified type     ICD-10-CM: R80.9 ICD-9-CM: 791.0 Vitals BP Pulse Height(growth percentile) Weight(growth percentile) SpO2 BMI  
 98/62 (BP 1 Location: Right arm, BP Patient Position: Sitting) 66 5' 2\" (1.575 m) 148 lb (67.1 kg) 94% 27.07 kg/m2 OB Status Smoking Status Postmenopausal Never Smoker BMI and BSA Data Body Mass Index Body Surface Area  
 27.07 kg/m 2 1.71 m 2 Preferred Pharmacy Pharmacy Name Phone [facility] [address], [address] [phone] Your Updated Medication List  
  
   
This list is accurate as of: 9/26/17  2:56 PM.  Always use your most recent med list.  
  
  
  
  
 allopurinol 100 mg tablet Commonly known as:  Perla Trujillo  
 Take 1 Tab by mouth daily. aspirin delayed-release 81 mg tablet 81 mg.  
  
 atorvastatin 80 mg tablet Commonly known as:  LIPITOR Take 1 Tab by mouth daily. bumetanide 2 mg tablet Commonly known as:  Veleria Mungo TAKE ONE & ONE-HALF TABLETS BY MOUTH TWICE DAILY  
  
 carvedilol 3.125 mg tablet Commonly known as:  Saul Dole Take 1 Tab by mouth two (2) times daily (with meals). colchicine 0.6 mg tablet Take 1 Tab by mouth daily. ezetimibe 10 mg tablet Commonly known as:  Michael Trenary Take 1 Tab by mouth daily. glimepiride 4 mg tablet Commonly known as:  AMARYL Take 2 mg by mouth two (2) times a day. levothyroxine 125 mcg tablet Commonly known as:  SYNTHROID Take 1 Tab by mouth Daily (before breakfast). magnesium oxide 400 mg tablet Commonly known as:  MAG-OX Take 1 Tab by mouth three (3) times daily. metOLazone 5 mg tablet Commonly known as:  ZAROXOLYN  
TAKE ONE TABLET BY MOUTH TWICE DAILY  
  
 omega-3 fatty acids-vitamin e 1,000 mg Cap Commonly known as:  FISH OIL Take 3 Caps by mouth daily. potassium chloride 20 mEq tablet Commonly known as:  K-DUR, KLOR-CON Take 3 Tabs by mouth daily. rivaroxaban 15 mg Tab tablet Commonly known as:  Daryn Maudlin Take  by mouth daily. sildenafil 20 mg tablet Commonly known as:  REVATIO Take 1 Tab by mouth three (3) times daily. spironolactone 25 mg tablet Commonly known as:  ALDACTONE Take 1 Tab by mouth two (2) times a day. We Performed the Following AMB POC EKG ROUTINE W/ 12 LEADS, INTER & REP [80077 CPT(R)] To-Do List   
 09/26/2017 Lab:  MICROALBUMIN, UR, 24 HR   
  
 09/26/2017 Lab:  PREALBUMIN Introducing [facility]! Carmen De La Cruz introduces [employer] patient portal. Now you can access parts of your medical record, email your doctor's office, and request medication refills online.    
 
1. In your internet browser, go to [email or web address] 2. Click on the First Time User? Click Here link in the Sign In box. You will see the New Member Sign Up page. 3. Enter your [employer] Access Code exactly as it appears below. You will not need to use this code after youve completed the sign-up process. If you do not sign up before the expiration date, you must request a new code. · [employer] Access Code: [email or web address] Expires: 12/25/2017  1:39 PM 
 
4. Enter the last four digits of your Social Security Number (xxxx) and Date of Birth (mm/dd/yyyy) as indicated and click Submit. You will be taken to the next sign-up page. 5. Create a [employer] ID. This will be your [employer] login ID and cannot be changed, so think of one that is secure and easy to remember. 6. Create a [employer] password. You can change your password at any time. 7. Enter your Password Reset Question and Answer. This can be used at a later time if you forget your password. 8. Enter your e-mail address. You will receive e-mail notification when new information is available in [address] 9. Click Sign Up. You can now view and download portions of your medical record. 10. Click the Download Summary menu link to download a portable copy of your medical information. If you have questions, please visit the Frequently Asked Questions section of the [employer] website. Remember, [employer] is NOT to be used for urgent needs. For medical emergencies, dial 911. Now available from your iPhone and Android! Please provide this summary of care documentation to your next provider. Your primary care clinician is listed as [address] [place] [address]. If you have any questions after today's visit, please call [phone].

## 2017-09-27 NOTE — PROGRESS NOTES
I have personally seen and evaluated the device findings. Interrogation reviewed and I agree with assessment.     Teri Diop

## 2017-09-28 RX ORDER — SPIRONOLACTONE 25 MG/1
25 TABLET ORAL 2 TIMES DAILY
Qty: 180 TAB | Refills: 3 | Status: SHIPPED | OUTPATIENT
Start: 2017-09-28 | End: 2018-11-02 | Stop reason: SDUPTHER

## 2017-10-02 DIAGNOSIS — I25.10 CAD IN NATIVE ARTERY: ICD-10-CM

## 2017-10-02 DIAGNOSIS — E78.5 DYSLIPIDEMIA: Primary | ICD-10-CM

## 2017-10-05 ENCOUNTER — HOSPITAL ENCOUNTER (OUTPATIENT)
Dept: LAB | Age: 57
Discharge: HOME | End: 2017-10-05
Payer: MEDICARE

## 2017-10-05 DIAGNOSIS — I25.10 CAD IN NATIVE ARTERY: ICD-10-CM

## 2017-10-05 DIAGNOSIS — E78.5 DYSLIPIDEMIA: ICD-10-CM

## 2017-10-05 DIAGNOSIS — R80.9 PROTEINURIA, UNSPECIFIED TYPE: ICD-10-CM

## 2017-10-05 LAB — PREALB SERPL-MCNC: 23.8 MG/DL (ref 20–40)

## 2017-10-05 PROCEDURE — 36415 COLL VENOUS BLD VENIPUNCTURE: CPT | Performed by: INTERNAL MEDICINE

## 2017-10-05 PROCEDURE — 83721 ASSAY OF BLOOD LIPOPROTEIN: CPT | Performed by: INTERNAL MEDICINE

## 2017-10-05 PROCEDURE — 84134 ASSAY OF PREALBUMIN: CPT | Performed by: INTERNAL MEDICINE

## 2017-10-06 LAB — LDLC SERPL DIRECT ASSAY-MCNC: 29 MG/DL (ref 0–99)

## 2017-10-06 NOTE — PROGRESS NOTES
Done per your note dated 9/26 \"It is unclear why she has a low albumin level and I will obtain a twenty-four hour urine for proteins to see if she has any significant degree of proteinuria. I will also obtain a prealbumin level to see if she has any issues with malabsorption or poor nutrition.  \"

## 2017-10-11 ENCOUNTER — TELEPHONE (OUTPATIENT)
Dept: CARDIOLOGY CLINIC | Age: 57
End: 2017-10-11

## 2017-10-11 DIAGNOSIS — I25.10 CAD IN NATIVE ARTERY: ICD-10-CM

## 2017-10-11 DIAGNOSIS — E78.5 DYSLIPIDEMIA: Primary | ICD-10-CM

## 2017-10-11 NOTE — TELEPHONE ENCOUNTER
----- Message from Any Caldera MD sent at 10/7/2017 12:02 PM EDT -----  She needs to eat more protein. Let her know. Repeat the DIRECT LDL level.  ----- Message -----     From: Arabella Hua LPN     Sent: 40/1/5667  11:28 AM       To: Any Caldera MD    Done per your note dated 9/26 \"It is unclear why she has a low albumin level and I will obtain a twenty-four hour urine for proteins to see if she has any significant degree of proteinuria. I will also obtain a prealbumin level to see if she has any issues with malabsorption or poor nutrition.  \"

## 2017-10-24 ENCOUNTER — HOSPITAL ENCOUNTER (OUTPATIENT)
Dept: LAB | Age: 57
Discharge: HOME | End: 2017-10-24
Payer: MEDICARE

## 2017-10-24 LAB
COLLECT DURATION TIME UR: 24 HR
CREAT 24H UR-MRATE: 497 MG/24HR (ref 600–2500)
MICROALBUMIN 24H UR-MRATE: 9 MG/24HR
MICROALBUMIN/CREAT 24H UR: 18 MG/G
SPECIMEN VOL ?TM UR: 1650 ML

## 2017-10-24 PROCEDURE — 82043 UR ALBUMIN QUANTITATIVE: CPT | Performed by: INTERNAL MEDICINE

## 2017-10-24 PROCEDURE — 83721 ASSAY OF BLOOD LIPOPROTEIN: CPT | Performed by: INTERNAL MEDICINE

## 2017-10-24 PROCEDURE — 36415 COLL VENOUS BLD VENIPUNCTURE: CPT | Performed by: INTERNAL MEDICINE

## 2017-10-25 ENCOUNTER — TELEPHONE (OUTPATIENT)
Dept: CARDIOLOGY CLINIC | Age: 57
End: 2017-10-25

## 2017-10-25 LAB — LDLC SERPL DIRECT ASSAY-MCNC: 40 MG/DL (ref 0–99)

## 2017-10-25 NOTE — TELEPHONE ENCOUNTER
----- Message from Lynnette Tijerina MD sent at 10/24/2017  5:35 PM EDT -----  Let her know it's normal whereas it was abnormal 7 years ago.

## 2017-12-18 RX ORDER — AZITHROMYCIN 250 MG/1
TABLET, FILM COATED ORAL
Qty: 6 TAB | Refills: 0 | Status: SHIPPED | OUTPATIENT
Start: 2017-12-18 | End: 2017-12-23

## 2017-12-28 RX ORDER — POTASSIUM CHLORIDE 20 MEQ/1
60 TABLET, EXTENDED RELEASE ORAL DAILY
Qty: 90 TAB | Refills: 6 | Status: SHIPPED | OUTPATIENT
Start: 2017-12-28 | End: 2019-01-02 | Stop reason: SDUPTHER

## 2018-01-26 RX ORDER — COLCHICINE 0.6 MG/1
0.6 TABLET ORAL DAILY
Qty: 30 TAB | Refills: 6 | Status: SHIPPED | OUTPATIENT
Start: 2018-01-26 | End: 2018-03-26 | Stop reason: SDUPTHER

## 2018-01-29 RX ORDER — CARVEDILOL 3.12 MG/1
3.12 TABLET ORAL 2 TIMES DAILY WITH MEALS
Qty: 60 TAB | Refills: 6 | Status: SHIPPED | OUTPATIENT
Start: 2018-01-29 | End: 2018-08-31 | Stop reason: SDUPTHER

## 2018-01-30 RX ORDER — ALLOPURINOL 100 MG/1
100 TABLET ORAL DAILY
Qty: 30 TAB | Refills: 6 | Status: SHIPPED | OUTPATIENT
Start: 2018-01-30 | End: 2018-09-04 | Stop reason: SDUPTHER

## 2018-02-25 RX ORDER — COLCHICINE 0.6 MG/1
TABLET ORAL
Qty: 30 TAB | Refills: 0 | Status: SHIPPED | OUTPATIENT
Start: 2018-02-25 | End: 2018-03-30 | Stop reason: SDUPTHER

## 2018-03-07 RX ORDER — BUMETANIDE 2 MG/1
TABLET ORAL
Qty: 90 TAB | Refills: 11 | Status: SHIPPED | OUTPATIENT
Start: 2018-03-07 | End: 2019-03-25 | Stop reason: SDUPTHER

## 2018-03-26 RX ORDER — COLCHICINE 0.6 MG/1
0.6 TABLET ORAL DAILY
Qty: 30 TAB | Refills: 11 | Status: SHIPPED | OUTPATIENT
Start: 2018-03-26 | End: 2018-04-24 | Stop reason: SDUPTHER

## 2018-04-01 RX ORDER — COLCHICINE 0.6 MG/1
TABLET ORAL
Qty: 30 TAB | Refills: 0 | Status: SHIPPED | OUTPATIENT
Start: 2018-04-01 | End: 2018-08-20 | Stop reason: SDUPTHER

## 2018-04-02 RX ORDER — SILDENAFIL CITRATE 20 MG/1
20 TABLET ORAL 3 TIMES DAILY
Qty: 90 TAB | Refills: 6 | Status: SHIPPED | OUTPATIENT
Start: 2018-04-02 | End: 2019-03-15 | Stop reason: SDUPTHER

## 2018-04-24 ENCOUNTER — CLINICAL SUPPORT (OUTPATIENT)
Dept: CARDIOLOGY CLINIC | Age: 58
End: 2018-04-24

## 2018-04-24 ENCOUNTER — OFFICE VISIT (OUTPATIENT)
Dept: CARDIOLOGY CLINIC | Age: 58
End: 2018-04-24

## 2018-04-24 VITALS
BODY MASS INDEX: 30.18 KG/M2 | HEART RATE: 65 BPM | WEIGHT: 164 LBS | SYSTOLIC BLOOD PRESSURE: 130 MMHG | DIASTOLIC BLOOD PRESSURE: 58 MMHG | HEIGHT: 62 IN | OXYGEN SATURATION: 94 %

## 2018-04-24 DIAGNOSIS — I48.20 CHRONIC ATRIAL FIBRILLATION (HCC): ICD-10-CM

## 2018-04-24 DIAGNOSIS — E78.5 DYSLIPIDEMIA: ICD-10-CM

## 2018-04-24 DIAGNOSIS — I27.20 PULMONARY HYPERTENSION (HCC): ICD-10-CM

## 2018-04-24 DIAGNOSIS — Z95.0 PACEMAKER: ICD-10-CM

## 2018-04-24 DIAGNOSIS — Z95.2 S/P AVR: Primary | ICD-10-CM

## 2018-04-24 DIAGNOSIS — I44.2 COMPLETE HEART BLOCK (HCC): Primary | ICD-10-CM

## 2018-04-24 DIAGNOSIS — J98.4 RESTRICTIVE LUNG DISEASE: ICD-10-CM

## 2018-04-24 NOTE — PROGRESS NOTES
HISTORY OF PRESENT ILLNESS  Wilmar Walker is a 65 y.o. female. HPI  She has been feeling quite well despite her of her complex medical issues. She denies chest pain, dyspnea, orthopnea, PND. She denies palpitation, dizziness or syncope. She denies any symptoms of TIA or amaurosis fugax. Her leg edema has been mild for which she has been taking diuretics. Her left arm lymphedema has not changed much. She has metabolic syndrome with high triglyceride and low HDL. Thus far, she has had no symptoms to indicate angina. Previous catheterization demonstrated patent coronary arteries thus far. Reptha was considered but not recommended since her diuretic LDL measurement was below 40. She has an extensive cardiac history. She had her first aortic valve replacement with the bioprosthetic aortic valve in July of 2006. 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 She subsequently underwent redo aortic valve replacement by TAVR successfully. Her repeat echocardiogram in June of 2016 demonstrated a normal LV functioning bioprosthetic aortic valve with a mean gradient of 16 mmHg and no evidence of significant paravalvular leak. She is known to have had diastolic dysfunction of the left ventricle, which was documented by cardiac catheterization with a high LVEDP. Her coronary artery system was patent with minor diffuse changes with no obstructive lesion by cardiac catheterization. She has developed severe pulmonary hypertension, which apparently preceded the aortic valve replacement. PA pressure was up to 90 mmHg in the past, which decreased to 50 mmHg on 06/28/16 by echocardiogram, at which time, the ejection fraction of the left ventricle was in the 60% range. The left atrium was moderately dilated and the right atrium was dilated as well. There was mild annular calcification of the mitral valve with mild regurgitation. The mean pressure gradient over the aortic valve was 17 mmHg. There was mild to moderate tricuspid regurgitation. She has had chronic atrial fibrillation and had significant bradycardia and underwent pacemaker implantation in 2013 because of the complete heart block. She also has an extensive noncardiac history that includes malignant melanoma that was treated with surgery, chemotherapy and radiation therapy in 2004, which resulted in lymphedema in the left arm. She is known to have had restrictive lung disease of unknown etiology. She has had hypertension, diabetes and severe dyslipidemia with extremely high triglycerides, low HDL and high total cholesterol. Cholesterol level was 304 with LDL of 39 and triglycerides of 521 in May of 2016. She has some history of renal dysfunction with over-diuresis in the past.  She had a followup echocardiogram on 02/21/2017, which demonstrated normal LV function with EF in the 75% range.  The left atrium was dilated with a volume index of 37 mL/meter2.  The bioprosthetic aortic valve appears to be functioning normally with the mean gradient of 14 mmHg.  There appears to be no significant perivalvular leak.  PA pressure was estimated at 49 mmHg. She has history of chemotherapy and extensive radiation treatment for melanoma, which may have resulted in the severe aortic stenosis in the beginning and perhaps some fibrotic lung disease as well along with the massive left arm lymphedema. Review of Systems   Constitutional: Negative for malaise/fatigue and weight loss. HENT: Negative for hearing loss. Eyes: Negative for blurred vision and double vision. Respiratory: Negative for shortness of breath. Cardiovascular: Positive for leg swelling. Negative for chest pain, palpitations, orthopnea, claudication and PND. Gastrointestinal: Negative for blood in stool, heartburn and melena. Genitourinary: Negative for dysuria, frequency, hematuria and urgency. Musculoskeletal: Negative for back pain and joint pain. Skin: Negative for itching and rash. Neurological: Negative for dizziness, loss of consciousness and weakness. Psychiatric/Behavioral: Negative for depression and memory loss. Physical Exam   Constitutional: She is oriented to person, place, and time. She appears well-developed and well-nourished. HENT:   Head: Normocephalic and atraumatic. Eyes: Conjunctivae are normal. Pupils are equal, round, and reactive to light. Neck: Normal range of motion. Neck supple. No JVD present. Cardiovascular: Normal rate, S1 normal and S2 normal.  An irregularly irregular rhythm present. No extrasystoles are present. PMI is not displaced. Exam reveals no gallop and no friction rub. Murmur heard. Harsh early systolic murmur is present with a grade of 2/6  at the upper right sternal border radiating to the neck  Pulses:       Carotid pulses are 3+ on the right side with bruit, and 3+ on the left side with bruit. Pulmonary/Chest: Effort normal. She has no rales. Abdominal: Soft. There is no tenderness. Musculoskeletal: She exhibits edema. Lt.arm lymphedema, trace leg edema   Neurological: She is alert and oriented to person, place, and time. No cranial nerve deficit. Skin: Skin is warm and dry. Psychiatric: She has a normal mood and affect. Her behavior is normal.     Visit Vitals    /58    Pulse 65    Ht 5' 2\" (1.575 m)    Wt 74.4 kg (164 lb)    SpO2 94%    BMI 30 kg/m2       Past Medical History:   Diagnosis Date    Aortic stenosis     #23 bioprosthetic AVR 7/06    Atrial fibrillation ([facility])     CHADS score 2  (-CHF, +HTN, -AGE, +DM, -CVA)    CAD in native artery     cor angio (Jan 2016): 50% pLAD, LCx luminal irreg, 30% mRCA.  pLAD MLA 12.7 (IVUS)    Chronic diastolic HF (heart failure) ([facility])     LV EF 55% (echo Nov 2015)    Complete heart block [facility])     periprocedural 7/06    Diabetes     Dyslipidemia     Femoral arteriovenous fistula, right (HCC)     R side with initially ? pseudoaneurysm, none on last PVL (Feb 11, 2016 @ [facility])    History of echocardiogram 10/13/2015    EF 55%. No WMA. Mild LVH. Indeterminate diastolic function. Dyssynergic septal motion.  RVE. RV systolic fx reduced, more in apical region. RVSP 60-70 mmHg (prev 50 mmHg on study of 3/19/14). Mod LAE.  MIC. Mild MR. Bioprosthetic AV w/mild-mod AS and mild-mod AI (mean grad 28 mmHg). Mod TR. Mild-mod PI. Mod IVCE. Poor inspiratory collapse.  History of exercise stress test 02/15/2006    Nondiagnostic stress test due to inability to attain diagnostic HR. Ex time 1 min 43 sec.  Hypertension     Hypothyroidism     Malignant melanoma ([address]     initial '95, recurrent '04, L supraclavicullar/axillary resection '04,    Malignant melanoma ([address]     neck reconstruction, L rectus free tissue transfer '04    Malignant melanoma ([address]     chemo, XRT    Obstructive sleep apnea     severe, CPAP    Pacemaker     7/06 [employer]IC    Pulmonary hypertension ([address]     cath (2006, prior to AVR): RA 20, RV 86/10, PA 83/33, PCWP 36. CO 3.3 (Zach)    Restrictive lung disease     moderate (PFTs 11/04)    S/P cardiac cath 03/06/2006    Patent coronary arteries. LVEDP 32 mmHg. EF 50-55%. No WMA. RA 20.  RV 86/10. PA 83/33. W 36.  CO 3.3 (Saroj Ayers). Severe AS. Severe pulm hypertension.  SVT     periprocedural herniorrhaphy 10/04       Social History     Social History    Marital status:      Spouse name: N/A    Number of children: N/A    Years of education: N/A     Occupational History    Not on file.      Social History Main Topics    Smoking status: Never Smoker    Smokeless tobacco: Never Used    Alcohol use Yes      Comment: rarely    Drug use: Not on file    Sexual activity: Not on file     Other Topics Concern    Not on file     Social History Narrative       Family History   Problem Relation Age of Onset    Elevated Lipids Mother     Heart Disease Mother     Diabetes Father     Heart Disease Father     Elevated Lipids Father     Hypertension Father     Cancer Father     Heart Disease Brother        Past Surgical History:   Procedure Laterality Date    HX AORTIC VALVE REPLACEMENT      #23 pericardial 7/06    HX HERNIA REPAIR      10/04 ventral herniorrhaphy    HX PACEMAKER      7/06    ND RADICAL RESEC TUMOR, NECK/CHEST      melanoma resection       Current Outpatient Prescriptions   Medication Sig Dispense Refill    sildenafil, antihypertensive, (REVATIO) 20 mg tablet Take 1 Tab by mouth three (3) times daily. 90 Tab 6    colchicine 0.6 mg tablet TAKE 1 TABLET BY MOUTH DAILY 30 Tab 0    bumetanide (BUMEX) 2 mg tablet TAKE ONE & ONE-HALF TABLETS BY MOUTH TWICE DAILY 90 Tab 11    allopurinol (ZYLOPRIM) 100 mg tablet Take 1 Tab by mouth daily. 30 Tab 6    carvedilol (COREG) 3.125 mg tablet Take 1 Tab by mouth two (2) times daily (with meals). 60 Tab 6    potassium chloride (K-DUR, KLOR-CON) 20 mEq tablet Take 3 Tabs by mouth daily. 90 Tab 6    spironolactone (ALDACTONE) 25 mg tablet Take 1 Tab by mouth two (2) times a day. 180 Tab 3    atorvastatin (LIPITOR) 80 mg tablet Take 1 Tab by mouth daily. 30 Tab 6    metOLazone (ZAROXOLYN) 5 mg tablet TAKE ONE TABLET BY MOUTH TWICE DAILY 60 Tab 6    levothyroxine (SYNTHROID) 125 mcg tablet Take 1 Tab by mouth Daily (before breakfast). 30 Tab 0    magnesium oxide (MAG-OX) 400 mg tablet Take 1 Tab by mouth three (3) times daily. 90 Tab 11    aspirin delayed-release 81 mg tablet 81 mg.      omega-3 fatty acids-vitamin e (FISH OIL) 1,000 mg cap Take 3 Caps by mouth daily. 90 Cap 6    glimepiride (AMARYL) 4 mg tablet Take 2 mg by mouth two (2) times a day.  rivaroxaban (XARELTO) 15 mg tab tablet Take  by mouth daily.  evolocumab (REPATHA SURECLICK) pen injection 1 mL by SubCUTAneous route every fourteen (14) days.  2 Pen 6    ezetimibe (ZETIA) 10 mg tablet Take 1 Tab by mouth daily. 30 Tab 6       EKG: unchanged from previous tracings, atrial fibrillation, rate controlled, frequent PVC's noted, 1:1 VVI pace maker activities  . ASSESSMENT and PLAN  Encounter Diagnoses   Name Primary?  S/P AVR 2006. Redo Left TF TAVR with a Medtronic CoreValve Evolut R 26 mm 2/16/16 Yes    Chronic atrial fibrillation ([address]     Pacemaker     Pulmonary hypertension ([address]     Dyslipidemia     Restrictive lung disease    Despite multiple medical problems, she is doing quite well. She has remained relatively asymptomatic other than mild leg edema and persistent left arm lymphedema. She has had no symptoms to indicate angina or cardiac decompensation. She has had no symptoms to indicate right heart failure despite pulmonary hypertension which has improved with PA pressure down to 49 mmHg. Her bioprosthetic aortic valve appears to be functioning adequately. Atrial fibrillation has been stable with good rate control. Her oxygen saturation seems to be adequate at 94% on room air.

## 2018-04-24 NOTE — MR AVS SNAPSHOT
[address] [place] [address] Rekha Mac [address] 
[phone] Patient: Violet Gillis MRN: [id number] :1960 Visit Information Date & Time Provider Department Dept. Phone Encounter #  
 2018  2:00 PM Katja Pelletier MD [facility] [address] [id number] Upcoming Health Maintenance Date Due Hepatitis C Screening 1960 FOOT EXAM Q1 1971 EYE EXAM RETINAL OR DILATED Q1 1971 Pneumococcal 19-64 Highest Risk (1 of 3 - PCV13) 1980 DTaP/Tdap/Td series (1 - Tdap) 1982 PAP AKA CERVICAL CYTOLOGY 1982 BREAST CANCER SCRN MAMMOGRAM 2011 FOBT Q 1 YEAR AGE 50-75 2011 HEMOGLOBIN A1C Q6M 2016 Influenza Age 5 to Adult 2017 MEDICARE YEARLY EXAM 3/14/2018 LIPID PANEL Q1 2018 MICROALBUMIN Q1 10/24/2018 Allergies as of 2018  Review Complete On: 2018 By: Katja Pelletier MD  
  
 Severity Noted Reaction Type Reactions Adhesive  2016    Itching Pradaxa [Dabigatran Etexilate]    Other (comments) GI distress Current Immunizations  Reviewed on 2016 No immunizations on file. Not reviewed this visit You Were Diagnosed With   
  
 Codes Comments S/P AVR    -  Primary ICD-10-CM: Z95.2 ICD-9-CM: V43.3   
 CAD in native artery     ICD-10-CM: I25.10 ICD-9-CM: 414.01 Proteinuria, unspecified type     ICD-10-CM: R80.9 ICD-9-CM: 791.0 Chronic atrial fibrillation (HCC)     ICD-10-CM: Y80.7 ICD-9-CM: 427.31 Pacemaker     ICD-10-CM: Z95.0 ICD-9-CM: V45.01   
 Pulmonary hypertension [facility]     ICD-10-CM: I27.20 ICD-9-CM: 416.8 Dyslipidemia     ICD-10-CM: E78.5 ICD-9-CM: 272.4 Vitals BP Pulse Height(growth percentile) Weight(growth percentile) SpO2 BMI  
 130/58 65 5' 2\" (1.575 m) 164 lb (74.4 kg) 94% 30 kg/m2 OB Status Smoking Status Postmenopausal Never Smoker Vitals History BMI and BSA Data Body Mass Index Body Surface Area  
 30 kg/m 2 1.8 m 2 Preferred Pharmacy Pharmacy Name Phone [facility], [address] [phone] Your Updated Medication List  
  
   
This list is accurate as of 4/24/18  3:06 PM.  Always use your most recent med list.  
  
  
  
  
 allopurinol 100 mg tablet Commonly known as:  Acquanetta Highman Take 1 Tab by mouth daily. aspirin delayed-release 81 mg tablet 81 mg.  
  
 atorvastatin 80 mg tablet Commonly known as:  LIPITOR Take 1 Tab by mouth daily. bumetanide 2 mg tablet Commonly known as:  Roshni Deana TAKE ONE & ONE-HALF TABLETS BY MOUTH TWICE DAILY  
  
 carvedilol 3.125 mg tablet Commonly known as:  Stewart Duonger Take 1 Tab by mouth two (2) times daily (with meals). colchicine 0.6 mg tablet TAKE 1 TABLET BY MOUTH DAILY  
  
 evolocumab pen injection Commonly known as:  Hardy Brantingham 1 mL by SubCUTAneous route every fourteen (14) days. ezetimibe 10 mg tablet Commonly known as:  Dory Arrant Take 1 Tab by mouth daily. glimepiride 4 mg tablet Commonly known as:  AMARYL Take 2 mg by mouth two (2) times a day. levothyroxine 125 mcg tablet Commonly known as:  SYNTHROID Take 1 Tab by mouth Daily (before breakfast). magnesium oxide 400 mg tablet Commonly known as:  MAG-OX Take 1 Tab by mouth three (3) times daily. metOLazone 5 mg tablet Commonly known as:  ZAROXOLYN  
TAKE ONE TABLET BY MOUTH TWICE DAILY  
  
 omega-3 fatty acids-vitamin e 1,000 mg Cap Commonly known as:  FISH OIL Take 3 Caps by mouth daily. potassium chloride 20 mEq tablet Commonly known as:  K-DUR, KLOR-CON Take 3 Tabs by mouth daily. rivaroxaban 15 mg Tab tablet Commonly known as:  Sisto Daylin Take  by mouth daily. sildenafil (antihypertensive) 20 mg tablet Commonly known as:  REVATIO Take 1 Tab by mouth three (3) times daily. spironolactone 25 mg tablet Commonly known as:  ALDACTONE Take 1 Tab by mouth two (2) times a day. We Performed the Following AMB POC EKG ROUTINE W/ 12 LEADS, INTER & REP [21554 CPT(R)] Introducing [facility]! Mayank Marie introduces [employer] patient portal. Now you can access parts of your medical record, email your doctor's office, and request medication refills online. 1. In your internet browser, go to [email or web address] 2. Click on the First Time User? Click Here link in the Sign In box. You will see the New Member Sign Up page. 3. Enter your [employer] Access Code exactly as it appears below. You will not need to use this code after youve completed the sign-up process. If you do not sign up before the expiration date, you must request a new code. · [employer] Access Code: [email or web address] Expires: 7/23/2018  1:40 PM 
 
4. Enter the last four digits of your Social Security Number (xxxx) and Date of Birth (mm/dd/yyyy) as indicated and click Submit. You will be taken to the next sign-up page. 5. Create a [employer] ID. This will be your [employer] login ID and cannot be changed, so think of one that is secure and easy to remember. 6. Create a [employer] password. You can change your password at any time. 7. Enter your Password Reset Question and Answer. This can be used at a later time if you forget your password. 8. Enter your e-mail address. You will receive e-mail notification when new information is available in [address] 9. Click Sign Up. You can now view and download portions of your medical record. 10. Click the Download Summary menu link to download a portable copy of your medical information. If you have questions, please visit the Frequently Asked Questions section of the [employer] website. Remember, [employer] is NOT to be used for urgent needs. For medical emergencies, dial 911. Now available from your iPhone and Android! Please provide this summary of care documentation to your next provider. Your primary care clinician is listed as [address] [place] [address]. If you have any questions after today's visit, please call [phone].

## 2018-04-24 NOTE — PROGRESS NOTES
1. Have you been to the ER, urgent care clinic since your last visit? Hospitalized since your last visit? No    2. Have you seen or consulted any other health care providers outside of the [address] [place] [address] since your last visit? Include any pap smears or colon screening.  No

## 2018-04-25 RX ORDER — LANOLIN ALCOHOL/MO/W.PET/CERES
400 CREAM (GRAM) TOPICAL 3 TIMES DAILY
Qty: 270 TAB | Refills: 3 | Status: SHIPPED | OUTPATIENT
Start: 2018-04-25 | End: 2019-01-01 | Stop reason: SDUPTHER

## 2018-04-25 NOTE — PROGRESS NOTES
I have personally seen and evaluated the device findings. Interrogation reviewed and I agree with assessment.     Cyndy Dudley

## 2018-05-09 RX ORDER — LEVOTHYROXINE SODIUM 150 UG/1
150 TABLET ORAL
Qty: 30 TAB | Refills: 9 | Status: SHIPPED | OUTPATIENT
Start: 2018-05-09 | End: 2019-03-19 | Stop reason: SDUPTHER

## 2018-07-13 RX ORDER — ATORVASTATIN CALCIUM 80 MG/1
80 TABLET, FILM COATED ORAL DAILY
Qty: 30 TAB | Refills: 6 | Status: SHIPPED | OUTPATIENT
Start: 2018-07-13 | End: 2019-01-01 | Stop reason: SDUPTHER

## 2018-08-21 RX ORDER — COLCHICINE 0.6 MG/1
TABLET ORAL
Qty: 30 TAB | Refills: 6 | Status: SHIPPED | OUTPATIENT
Start: 2018-08-21

## 2018-08-22 RX ORDER — METOLAZONE 5 MG/1
TABLET ORAL
Qty: 60 TAB | Refills: 6 | Status: SHIPPED | OUTPATIENT
Start: 2018-08-22 | End: 2019-01-01 | Stop reason: SDUPTHER

## 2018-08-31 RX ORDER — CARVEDILOL 3.12 MG/1
3.12 TABLET ORAL 2 TIMES DAILY WITH MEALS
Qty: 60 TAB | Refills: 6 | Status: SHIPPED | OUTPATIENT
Start: 2018-08-31 | End: 2019-04-18 | Stop reason: SDUPTHER

## 2018-09-06 RX ORDER — ALLOPURINOL 100 MG/1
100 TABLET ORAL DAILY
Qty: 30 TAB | Refills: 6 | Status: SHIPPED | OUTPATIENT
Start: 2018-09-06 | End: 2019-04-24 | Stop reason: SDUPTHER

## 2018-10-24 DIAGNOSIS — I27.20 PULMONARY HYPERTENSION (HCC): ICD-10-CM

## 2018-10-24 DIAGNOSIS — I11.9 BENIGN HYPERTENSIVE HEART DISEASE WITHOUT HEART FAILURE: Primary | ICD-10-CM

## 2018-10-24 DIAGNOSIS — Z95.2 S/P AVR: ICD-10-CM

## 2018-10-24 DIAGNOSIS — E78.5 DYSLIPIDEMIA: ICD-10-CM

## 2018-11-02 RX ORDER — SPIRONOLACTONE 25 MG/1
25 TABLET ORAL 2 TIMES DAILY
Qty: 180 TAB | Refills: 3 | Status: SHIPPED | OUTPATIENT
Start: 2018-11-02 | End: 2019-01-01 | Stop reason: SDUPTHER

## 2018-11-20 ENCOUNTER — TELEPHONE (OUTPATIENT)
Dept: CARDIOLOGY CLINIC | Age: 58
End: 2018-11-20

## 2018-11-20 DIAGNOSIS — M10.9 GOUT, UNSPECIFIED CAUSE, UNSPECIFIED CHRONICITY, UNSPECIFIED SITE: Primary | ICD-10-CM

## 2018-11-20 NOTE — TELEPHONE ENCOUNTER
Incoming from pt. Two patient Identifiers confirmed. Advised she has upcoming labs pended and wanted to know if she could have a uric acid as well due to Dr Stephanie lAva rx given for gout. Advised pt I would consult with Dr Stephanie Alva and pend order. Pt verbalized understanding.

## 2018-11-26 ENCOUNTER — OFFICE VISIT (OUTPATIENT)
Dept: CARDIOLOGY CLINIC | Age: 58
End: 2018-11-26

## 2018-11-26 ENCOUNTER — HOSPITAL ENCOUNTER (OUTPATIENT)
Dept: LAB | Age: 58
Discharge: HOME | End: 2018-11-26
Payer: MEDICARE

## 2018-11-26 ENCOUNTER — CLINICAL SUPPORT (OUTPATIENT)
Dept: CARDIOLOGY CLINIC | Age: 58
End: 2018-11-26

## 2018-11-26 VITALS
OXYGEN SATURATION: 92 % | BODY MASS INDEX: 30.18 KG/M2 | SYSTOLIC BLOOD PRESSURE: 122 MMHG | HEIGHT: 62 IN | WEIGHT: 164 LBS | HEART RATE: 61 BPM | DIASTOLIC BLOOD PRESSURE: 62 MMHG

## 2018-11-26 DIAGNOSIS — I11.9 BENIGN HYPERTENSIVE HEART DISEASE WITHOUT HEART FAILURE: ICD-10-CM

## 2018-11-26 DIAGNOSIS — I27.20 PULMONARY HYPERTENSION (HCC): ICD-10-CM

## 2018-11-26 DIAGNOSIS — I48.20 CHRONIC ATRIAL FIBRILLATION (HCC): ICD-10-CM

## 2018-11-26 DIAGNOSIS — Z95.0 PACEMAKER: ICD-10-CM

## 2018-11-26 DIAGNOSIS — I44.2 COMPLETE HEART BLOCK (HCC): Primary | ICD-10-CM

## 2018-11-26 DIAGNOSIS — Z95.2 S/P AVR: Primary | ICD-10-CM

## 2018-11-26 DIAGNOSIS — J98.4 RESTRICTIVE LUNG DISEASE: ICD-10-CM

## 2018-11-26 DIAGNOSIS — M10.9 GOUT, UNSPECIFIED CAUSE, UNSPECIFIED CHRONICITY, UNSPECIFIED SITE: ICD-10-CM

## 2018-11-26 DIAGNOSIS — E78.5 DYSLIPIDEMIA: ICD-10-CM

## 2018-11-26 LAB
ALBUMIN SERPL-MCNC: 2.2 G/DL (ref 3.4–5)
ALBUMIN/GLOB SERPL: 0.8 {RATIO} (ref 0.8–1.7)
ALP SERPL-CCNC: 234 U/L (ref 45–117)
ALT SERPL-CCNC: 35 U/L (ref 13–56)
ANION GAP SERPL CALC-SCNC: 9 MMOL/L (ref 3–18)
AST SERPL-CCNC: 26 U/L (ref 15–37)
BILIRUB SERPL-MCNC: 0.6 MG/DL (ref 0.2–1)
BUN SERPL-MCNC: 49 MG/DL (ref 7–18)
BUN/CREAT SERPL: 47 (ref 12–20)
CALCIUM SERPL-MCNC: 7.3 MG/DL (ref 8.5–10.1)
CHLORIDE SERPL-SCNC: 104 MMOL/L (ref 100–108)
CHOLEST SERPL-MCNC: 352 MG/DL
CO2 SERPL-SCNC: 24 MMOL/L (ref 21–32)
CREAT SERPL-MCNC: 1.05 MG/DL (ref 0.6–1.3)
GLOBULIN SER CALC-MCNC: 2.9 G/DL (ref 2–4)
GLUCOSE SERPL-MCNC: 186 MG/DL (ref 74–99)
HDLC SERPL-MCNC: 38 MG/DL (ref 40–60)
HDLC SERPL: 9.3 {RATIO} (ref 0–5)
LDLC SERPL CALC-MCNC: ABNORMAL MG/DL (ref 0–100)
LIPID PROFILE,FLP: ABNORMAL
POTASSIUM SERPL-SCNC: 4.1 MMOL/L (ref 3.5–5.5)
PROT SERPL-MCNC: 5.1 G/DL (ref 6.4–8.2)
SODIUM SERPL-SCNC: 137 MMOL/L (ref 136–145)
TRIGL SERPL-MCNC: 1316 MG/DL (ref ?–150)
URATE SERPL-MCNC: 8 MG/DL (ref 2.6–7.2)
VLDLC SERPL CALC-MCNC: ABNORMAL MG/DL

## 2018-11-26 PROCEDURE — 84550 ASSAY OF BLOOD/URIC ACID: CPT

## 2018-11-26 PROCEDURE — 80061 LIPID PANEL: CPT

## 2018-11-26 PROCEDURE — 80053 COMPREHEN METABOLIC PANEL: CPT

## 2018-11-26 PROCEDURE — 36415 COLL VENOUS BLD VENIPUNCTURE: CPT

## 2018-11-26 RX ORDER — FENOFIBRATE 145 MG/1
145 TABLET, COATED ORAL DAILY
Qty: 30 TAB | Refills: 6 | Status: SHIPPED | OUTPATIENT
Start: 2018-11-26

## 2018-11-26 NOTE — PROGRESS NOTES
Chacha Taveras presents today for   Chief Complaint   Patient presents with    Follow-up     7 month     Palpitations     racing and fluttering once a month     Leg Swelling       Chacha Taveras preferred language for health care discussion is english/other. Is someone accompanying this pt? No    Is the patient using any DME equipment during OV? No    Depression Screening:  PHQ over the last two weeks 4/24/2018   Little interest or pleasure in doing things Not at all   Feeling down, depressed, irritable, or hopeless Not at all   Total Score PHQ 2 0       Learning Assessment:  Learning Assessment 3/9/2016   PRIMARY LEARNER Patient   HIGHEST LEVEL OF EDUCATION - PRIMARY LEARNER  -   BARRIERS PRIMARY LEARNER -   [facility]    NEED -   LEARNER PREFERENCE PRIMARY DEMONSTRATION   ANSWERED BY Patient   RELATIONSHIP SELF       Abuse Screening:  No flowsheet data found. Fall Risk  No flowsheet data found. Pt currently taking Antiplatelet therapy? Xarelto     Coordination of Care:  1. Have you been to the ER, urgent care clinic since your last visit? Hospitalized since your last visit? No    2. Have you seen or consulted any other health care providers outside of the [address] [place] [address] since your last visit? Include any pap smears or colon screening.  No

## 2018-11-26 NOTE — PROGRESS NOTES
HISTORY OF PRESENT ILLNESS  Macey Reyes is a 63 y.o. female. HPI    She has been doing quite well. She denies any cardiac symptoms other than occasional palpitation without prolonged palpitation. She continues with the left arm lymphedema and mild leg edema. She denies chest pain, dyspnea, orthopnea, PND. She denies dizziness or syncope. She denies any symptoms of TIA or amaurosis fugax. She has an extensive cardiac history. She had her first aortic valve replacement with the bioprosthetic aortic valve in July of 2006. 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 She subsequently underwent redo aortic valve replacement by TAVR successfully. Her repeat echocardiogram in June of 2016 demonstrated a normal LV functioning bioprosthetic aortic valve with a mean gradient of 16 mmHg and no evidence of significant paravalvular leak. She is known to have had diastolic dysfunction of the left ventricle, which was documented by cardiac catheterization with a high LVEDP. Her coronary artery system was patent with minor diffuse changes with no obstructive lesion by cardiac catheterization. She has developed severe pulmonary hypertension, which apparently preceded the aortic valve replacement. PA pressure was up to 90 mmHg in the past, which decreased to 50 mmHg on 06/28/16 by echocardiogram, at which time, the ejection fraction of the left ventricle was in the 60% range. The left atrium was moderately dilated and the right atrium was dilated as well. There was mild annular calcification of the mitral valve with mild regurgitation. The mean pressure gradient over the aortic valve was 17 mmHg. There was mild to moderate tricuspid regurgitation. She has had chronic atrial fibrillation and had significant bradycardia and underwent pacemaker implantation in 2013 because of the complete heart block.  She also has an extensive noncardiac history that includes malignant melanoma that was treated with surgery, chemotherapy and radiation therapy in 2004, which resulted in lymphedema in the left arm. She is known to have had restrictive lung disease of unknown etiology. She has had hypertension, diabetes and severe dyslipidemia with extremely high triglycerides, low HDL and high total cholesterol. Cholesterol level was 304 with LDL of 39 and triglycerides of 521 in May of 2016. She has some history of renal dysfunction with over-diuresis in the past.  She had a followup echocardiogram on 02/21/2017, which demonstrated normal LV function with EF in the 75% range.  The left atrium was dilated with a volume index of 37 mL/meter2.  The bioprosthetic aortic valve appears to be functioning normally with the mean gradient of 14 mmHg.  There appears to be no significant perivalvular leak.  PA pressure was estimated at 49 mmHg.      She has history of chemotherapy and extensive radiation treatment for melanoma, which may have resulted in the severe aortic stenosis in the beginning and perhaps some fibrotic lung disease as well along with the massive left arm lymphedema.    She has metabolic syndrome with high triglyceride and low HDL. Thus far, she has had no symptoms to indicate angina. Previous catheterization demonstrated patent coronary arteries thus far. Reptha was considered but not recommended since her direct LDL measurement was below 40. Review of Systems   Constitutional: Negative for malaise/fatigue and weight loss. HENT: Negative for hearing loss. Eyes: Negative for blurred vision and double vision. Respiratory: Negative for shortness of breath. Cardiovascular: Positive for palpitations and leg swelling. Negative for chest pain, orthopnea, claudication and PND. Gastrointestinal: Negative for blood in stool, heartburn and melena. Genitourinary: Negative for dysuria, frequency, hematuria and urgency. Musculoskeletal: Negative for back pain and joint pain. Skin: Negative for itching and rash. Neurological: Negative for dizziness, loss of consciousness and weakness. Psychiatric/Behavioral: Negative for depression and memory loss. Physical Exam   Constitutional: She is oriented to person, place, and time. She appears well-developed and well-nourished. HENT:   Head: Normocephalic and atraumatic. Eyes: Conjunctivae are normal. Pupils are equal, round, and reactive to light. Neck: Normal range of motion. Neck supple. No JVD present. Cardiovascular: Normal rate, S1 normal and S2 normal. An irregularly irregular rhythm present. No extrasystoles are present. PMI is not displaced. Exam reveals no gallop and no friction rub. Murmur heard. Harsh early systolic murmur is present with a grade of 2/6 at the upper right sternal border radiating to the neck. Pulses:       Carotid pulses are 3+ on the right side, and 3+ on the left side. Pulmonary/Chest: Effort normal. She has no rales. Abdominal: Soft. There is no tenderness. Musculoskeletal: She exhibits edema. Lt. Arm lymphedema   Neurological: She is alert and oriented to person, place, and time. No cranial nerve deficit. Skin: Skin is warm and dry. Psychiatric: She has a normal mood and affect. Her behavior is normal.     Visit Vitals  /62   Pulse 61   Ht 5' 2\" (1.575 m)   Wt 74.4 kg (164 lb)   SpO2 92%   BMI 30.00 kg/m²       Past Medical History:   Diagnosis Date    Aortic stenosis     #23 bioprosthetic AVR 7/06    Atrial fibrillation ([facility])     CHADS score 2  (-CHF, +HTN, -AGE, +DM, -CVA)    CAD in native artery     cor angio (Jan 2016): 50% pLAD, LCx luminal irreg, 30% mRCA.  pLAD MLA 12.7 (IVUS)    Chronic diastolic HF (heart failure) ([facility])     LV EF 55% (echo Nov 2015)    Complete heart block ([facility])     periprocedural 7/06    Diabetes     Dyslipidemia     Femoral arteriovenous fistula, right ([facility])     R side with initially ? pseudoaneurysm, none on last PVL (Feb 11, 2016 @ [facility])    History of echocardiogram 10/13/2015    EF 55%. No WMA. Mild LVH. Indeterminate diastolic function. Dyssynergic septal motion.  RVE. RV systolic fx reduced, more in apical region. RVSP 60-70 mmHg (prev 50 mmHg on study of 3/19/14). Mod LAE.  MIC. Mild MR. Bioprosthetic AV w/mild-mod AS and mild-mod AI (mean grad 28 mmHg). Mod TR. Mild-mod PI. Mod IVCE. Poor inspiratory collapse.  History of exercise stress test 02/15/2006    Nondiagnostic stress test due to inability to attain diagnostic HR. Ex time 1 min 43 sec.  Hypertension     Hypothyroidism     Malignant melanoma ([address]     initial '95, recurrent '04, L supraclavicullar/axillary resection '04,    Malignant melanoma ([address]     neck reconstruction, L rectus free tissue transfer '04    Malignant melanoma ([address]     chemo, XRT    Obstructive sleep apnea     severe, CPAP    Pacemaker     7/06 MEDTRONIC    Pulmonary hypertension ([address]     cath (2006, prior to AVR): RA 20, RV 86/10, PA 83/33, PCWP 36. CO 3.3 (Zach)    Restrictive lung disease     moderate (PFTs 11/04)    S/P cardiac cath 03/06/2006    Patent coronary arteries. LVEDP 32 mmHg. EF 50-55%. No WMA. RA 20.  RV 86/10. PA 83/33. W 36.  CO 3.3 ([facility]). Severe AS. Severe pulm hypertension.     SVT     periprocedural herniorrhaphy 10/04       Social History     Socioeconomic History    Marital status:      Spouse name: Not on file    Number of children: Not on file    Years of education: Not on file    Highest education level: Not on file   Social Needs    Financial resource strain: Not on file    Food insecurity - worry: Not on file    Food insecurity - inability: Not on file   [employer] needs - medical: Not on file   [employer] needs - non-medical: Not on file   Occupational History    Not on file   Tobacco Use    Smoking status: Never Smoker    Smokeless tobacco: Never Used Substance and Sexual Activity    Alcohol use: Yes     Comment: rarely    Drug use: Not on file    Sexual activity: Not on file   Other Topics Concern    Not on file   Social History Narrative    Not on file       Family History   Problem Relation Age of Onset    Elevated Lipids Mother     Heart Disease Mother     Diabetes Father     Heart Disease Father     Elevated Lipids Father     Hypertension Father     Cancer Father     Heart Disease Brother        Past Surgical History:   Procedure Laterality Date    HX AORTIC VALVE REPLACEMENT      #23 pericardial 7/06    HX HERNIA REPAIR      10/04 ventral herniorrhaphy    HX PACEMAKER      7/06    NC RADICAL RESEC TUMOR, NECK/CHEST      melanoma resection       Current Outpatient Medications   Medication Sig Dispense Refill    spironolactone (ALDACTONE) 25 mg tablet Take 1 Tab by mouth two (2) times a day. 180 Tab 3    allopurinol (ZYLOPRIM) 100 mg tablet Take 1 Tab by mouth daily. 30 Tab 6    carvedilol (COREG) 3.125 mg tablet Take 1 Tab by mouth two (2) times daily (with meals). 60 Tab 6    metOLazone (ZAROXOLYN) 5 mg tablet TAKE ONE TABLET BY MOUTH TWICE DAILY 60 Tab 6    colchicine 0.6 mg tablet TAKE 1 TABLET BY MOUTH DAILY 30 Tab 6    atorvastatin (LIPITOR) 80 mg tablet Take 1 Tab by mouth daily. 30 Tab 6    levothyroxine (SYNTHROID) 150 mcg tablet Take 1 Tab by mouth Daily (before breakfast). 30 Tab 9    magnesium oxide (MAG-OX) 400 mg tablet Take 1 Tab by mouth three (3) times daily. 270 Tab 3    sildenafil, antihypertensive, (REVATIO) 20 mg tablet Take 1 Tab by mouth three (3) times daily. 90 Tab 6    bumetanide (BUMEX) 2 mg tablet TAKE ONE & ONE-HALF TABLETS BY MOUTH TWICE DAILY (Patient taking differently: 2 mg daily. TAKE ONE & ONE-HALF TABLETS BY MOUTH TWICE DAILY) 90 Tab 11    potassium chloride (K-DUR, KLOR-CON) 20 mEq tablet Take 3 Tabs by mouth daily.  90 Tab 6    aspirin delayed-release 81 mg tablet 81 mg.      omega-3 fatty acids-vitamin e (FISH OIL) 1,000 mg cap Take 3 Caps by mouth daily. 90 Cap 6    glimepiride (AMARYL) 4 mg tablet Take 2 mg by mouth two (2) times a day.  rivaroxaban (XARELTO) 15 mg tab tablet Take  by mouth daily. EKG: unchanged from previous tracings, atrial fibrillation, rate controlled occasional PVC noted, unifocal, 1:1 VVI pacer activiyies  . ASSESSMENT and PLAN  Encounter Diagnoses   Name Primary?  S/P AVR 2006. Redo Left TF TAVR with a Medtronic CoreValve Evolut R 26 mm 2/16/16 Yes    Chronic atrial fibrillation ([address]     Pacemaker     Pulmonary hypertension ([address]     Dyslipidemia     Restrictive lung disease    She has been doing well. She has had no symptoms to indicate angina or cardiac decompensation. Her bioprosthetic aortic valve appears to be functioning normally. She shows no signs of right heart failure. Her oxygen saturation seems to be adequate but I would re-evaluate her nocturnal oxygen saturation to see if she has any indication for home oxygen treatment. Her atrial fibrillation has been stable with good rate control.

## 2018-11-27 NOTE — PROGRESS NOTES
I have personally seen and evaluated the device findings. Interrogation reviewed and I agree with assessment.     Shea Nicole

## 2018-12-03 ENCOUNTER — TELEPHONE (OUTPATIENT)
Dept: CARDIOLOGY CLINIC | Age: 58
End: 2018-12-03

## 2018-12-27 RX ORDER — GLIMEPIRIDE 4 MG/1
TABLET ORAL
Qty: 60 TAB | Refills: 2 | Status: SHIPPED | OUTPATIENT
Start: 2018-12-27 | End: 2019-01-01 | Stop reason: SDUPTHER

## 2019-01-01 ENCOUNTER — OFFICE VISIT (OUTPATIENT)
Dept: CARDIOLOGY CLINIC | Age: 59
End: 2019-01-01

## 2019-01-01 ENCOUNTER — CLINICAL SUPPORT (OUTPATIENT)
Dept: CARDIOLOGY CLINIC | Age: 59
End: 2019-01-01

## 2019-01-01 VITALS
DIASTOLIC BLOOD PRESSURE: 66 MMHG | BODY MASS INDEX: 30.36 KG/M2 | SYSTOLIC BLOOD PRESSURE: 140 MMHG | WEIGHT: 165 LBS | OXYGEN SATURATION: 96 % | HEART RATE: 60 BPM | HEIGHT: 62 IN

## 2019-01-01 DIAGNOSIS — Z95.0 PACEMAKER: ICD-10-CM

## 2019-01-01 DIAGNOSIS — Z95.2 S/P AVR: Primary | ICD-10-CM

## 2019-01-01 DIAGNOSIS — I44.2 COMPLETE HEART BLOCK (HCC): Primary | ICD-10-CM

## 2019-01-01 DIAGNOSIS — I48.20 CHRONIC ATRIAL FIBRILLATION (HCC): ICD-10-CM

## 2019-01-01 DIAGNOSIS — E78.5 DYSLIPIDEMIA: ICD-10-CM

## 2019-01-01 DIAGNOSIS — I27.20 PULMONARY HYPERTENSION (HCC): ICD-10-CM

## 2019-01-01 DIAGNOSIS — J98.4 RESTRICTIVE LUNG DISEASE: ICD-10-CM

## 2019-01-01 RX ORDER — METOLAZONE 5 MG/1
TABLET ORAL
Qty: 60 TAB | Refills: 6 | Status: SHIPPED | OUTPATIENT
Start: 2019-01-01

## 2019-01-01 RX ORDER — CARVEDILOL 3.12 MG/1
3.12 TABLET ORAL 2 TIMES DAILY WITH MEALS
Qty: 60 TAB | Refills: 6 | Status: SHIPPED | OUTPATIENT
Start: 2019-01-01 | End: 2019-01-01 | Stop reason: SDUPTHER

## 2019-01-01 RX ORDER — SPIRONOLACTONE 25 MG/1
25 TABLET ORAL 2 TIMES DAILY
Qty: 180 TAB | Refills: 3 | Status: SHIPPED | OUTPATIENT
Start: 2019-01-01

## 2019-01-01 RX ORDER — METOLAZONE 5 MG/1
TABLET ORAL
Qty: 60 TAB | Refills: 6 | Status: SHIPPED | OUTPATIENT
Start: 2019-01-01 | End: 2019-01-01 | Stop reason: SDUPTHER

## 2019-01-01 RX ORDER — GLIMEPIRIDE 4 MG/1
4 TABLET ORAL DAILY
Qty: 60 TAB | Refills: 7 | Status: SHIPPED | OUTPATIENT
Start: 2019-01-01

## 2019-01-01 RX ORDER — CEPHALEXIN 500 MG/1
500 CAPSULE ORAL 3 TIMES DAILY
Qty: 30 CAP | Refills: 3 | Status: SHIPPED | OUTPATIENT
Start: 2019-01-01 | End: 2020-01-01 | Stop reason: ALTCHOICE

## 2019-01-01 RX ORDER — POTASSIUM CHLORIDE 20 MEQ/1
60 TABLET, EXTENDED RELEASE ORAL DAILY
Qty: 90 TAB | Refills: 6 | Status: SHIPPED | OUTPATIENT
Start: 2019-01-01

## 2019-01-01 RX ORDER — CARVEDILOL 3.12 MG/1
3.12 TABLET ORAL 2 TIMES DAILY WITH MEALS
Qty: 60 TAB | Refills: 6 | Status: SHIPPED | OUTPATIENT
Start: 2019-01-01

## 2019-01-01 RX ORDER — LANOLIN ALCOHOL/MO/W.PET/CERES
400 CREAM (GRAM) TOPICAL 3 TIMES DAILY
Qty: 180 TAB | Refills: 4 | Status: SHIPPED | OUTPATIENT
Start: 2019-01-01

## 2019-01-01 RX ORDER — ATORVASTATIN CALCIUM 80 MG/1
80 TABLET, FILM COATED ORAL DAILY
Qty: 30 TAB | Refills: 6 | Status: SHIPPED | OUTPATIENT
Start: 2019-01-01

## 2019-01-02 ENCOUNTER — TELEPHONE (OUTPATIENT)
Dept: CARDIOLOGY CLINIC | Age: 59
End: 2019-01-02

## 2019-01-02 RX ORDER — POTASSIUM CHLORIDE 20 MEQ/1
60 TABLET, EXTENDED RELEASE ORAL DAILY
Qty: 90 TAB | Refills: 6 | Status: SHIPPED | OUTPATIENT
Start: 2019-01-02 | End: 2019-01-01 | Stop reason: SDUPTHER

## 2019-01-02 NOTE — TELEPHONE ENCOUNTER
Pt daughter in office. Two pt identifiers confirmed. She advised pt inquiring if medication was sent to pharmacy because [facility] is stating they never received it. Advised pts daughter that mediation was sent to [address] [place] [address] on 12/27/2018. No other issues noted.

## 2019-03-15 RX ORDER — SILDENAFIL CITRATE 20 MG/1
20 TABLET ORAL 3 TIMES DAILY
Qty: 90 TAB | Refills: 6 | Status: SHIPPED | OUTPATIENT
Start: 2019-03-15 | End: 2020-01-01 | Stop reason: SDUPTHER

## 2019-03-19 RX ORDER — LEVOTHYROXINE SODIUM 150 UG/1
150 TABLET ORAL
Qty: 30 TAB | Refills: 11 | Status: SHIPPED | OUTPATIENT
Start: 2019-03-19 | End: 2020-01-01 | Stop reason: SDUPTHER

## 2019-03-25 RX ORDER — BUMETANIDE 2 MG/1
TABLET ORAL
Qty: 90 TAB | Refills: 6 | Status: SHIPPED | OUTPATIENT
Start: 2019-03-25 | End: 2020-01-01 | Stop reason: SDUPTHER

## 2019-04-18 RX ORDER — CARVEDILOL 3.12 MG/1
3.12 TABLET ORAL 2 TIMES DAILY WITH MEALS
Qty: 60 TAB | Refills: 6 | Status: SHIPPED | OUTPATIENT
Start: 2019-04-18 | End: 2019-01-01 | Stop reason: SDUPTHER

## 2019-04-24 RX ORDER — ALLOPURINOL 100 MG/1
100 TABLET ORAL DAILY
Qty: 30 TAB | Refills: 10 | Status: SHIPPED | OUTPATIENT
Start: 2019-04-24 | End: 2020-01-01

## 2019-06-11 NOTE — PROGRESS NOTES
Daryl Griffiths presents today for Chief Complaint Patient presents with  Irregular Heart Beat  
  6 MONTH F/U  
 Pacemaker Check Daryl Griffiths preferred language for health care discussion is english/other. Is someone accompanying this pt? no 
 
Is the patient using any DME equipment during [address]? no 
 
Depression Screening: 
3 most recent PHQ Screens 4/24/2018 Little interest or pleasure in doing things Not at all Feeling down, depressed, irritable, or hopeless Not at all Total Score PHQ 2 0 Learning Assessment: 
Learning Assessment 3/9/2016 PRIMARY LEARNER Patient HIGHEST LEVEL OF EDUCATION - PRIMARY LEARNER  -  
BARRIERS PRIMARY LEARNER -  
CO-LEARNER CAREGIVER -  
PRIMARY LANGUAGE ENGLISH  NEED -  
LEARNER PREFERENCE PRIMARY DEMONSTRATION  
ANSWERED BY Patient RELATIONSHIP SELF Abuse Screening: No flowsheet data found. Fall Risk No flowsheet data found. Pt currently taking Anticoagulant therapy? yes Coordination of Care: 1. Have you been to the ER, urgent care clinic since your last visit? Hospitalized since your last visit? no 
 
2. Have you seen or consulted any other health care providers outside of the [address] [place] [address] since your last visit? Include any pap smears or colon screening.  no

## 2019-06-11 NOTE — PROGRESS NOTES
HISTORY OF PRESENT ILLNESS Maria T Leblanc is a 63 y.o. female. HPI As usual she states she has been feeling well. She has had no chest pain, dyspnea, orthopnea, PND. She denies palpitation despite atrial fibrillation. She may get a few flutters here and there. She has had no dizziness or syncope. She has had no symptoms to indicate TIA or amaurosis fugax. She has an extensive cardiac history. She had her first aortic valve replacement with the bioprosthetic aortic valve in July of 2006. 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 She subsequently underwent redo aortic valve replacement by TAVR successfully. Her repeat echocardiogram in June of 2016 demonstrated a normal LV functioning bioprosthetic aortic valve with a mean gradient of 16 mmHg and no evidence of significant paravalvular leak. She is known to have had diastolic dysfunction of the left ventricle, which was documented by cardiac catheterization with a high LVEDP. Her coronary artery system was patent with minor diffuse changes with no obstructive lesion by cardiac catheterization. She has developed severe pulmonary hypertension, which apparently preceded the aortic valve replacement. PA pressure was up to 90 mmHg in the past, which decreased to 50 mmHg on 06/28/16 by echocardiogram, at which time, the ejection fraction of the left ventricle was in the 60% range. The left atrium was moderately dilated and the right atrium was dilated as well. There was mild annular calcification of the mitral valve with mild regurgitation. The mean pressure gradient over the aortic valve was 17 mmHg. There was mild to moderate tricuspid regurgitation. She has had chronic atrial fibrillation and had significant bradycardia and underwent pacemaker implantation in 2013 because of the complete heart block.  She also has an extensive noncardiac history that includes malignant melanoma that was treated with surgery, chemotherapy and radiation therapy in 2004, which resulted in lymphedema in the left arm. She is known to have had restrictive lung disease of unknown etiology. She has had hypertension, diabetes and severe dyslipidemia with extremely high triglycerides, low HDL and high total cholesterol. Cholesterol level was 304 with LDL of 39 and triglycerides of 521 in May of 2016. She has some history of renal dysfunction with over-diuresis in the past. 
She had a followup echocardiogram on 02/21/2017, which demonstrated normal LV function with EF in the 75% range.  The left atrium was dilated with a volume index of 37 mL/meter2.  The bioprosthetic aortic valve appears to be functioning normally with the mean gradient of 14 mmHg.  There appears to be no significant perivalvular leak.  PA pressure was estimated at 49 mmHg.  
  
She has history of chemotherapy and extensive radiation treatment for melanoma, which may have resulted in the severe aortic stenosis in the beginning and perhaps some fibrotic lung disease as well along with the massive left arm lymphedema.   
She has metabolic syndrome with high triglyceride and low HDL. Thus far, she has had no symptoms to indicate angina. Previous catheterization demonstrated patent coronary arteries thus far. Reptha was considered but not recommended since her direct LDL measurement was below 40. 
  
Review of Systems Constitutional: Negative for malaise/fatigue and weight loss. HENT: Negative for hearing loss. Eyes: Negative for blurred vision and double vision. Respiratory: Negative for shortness of breath. Cardiovascular: Positive for palpitations and leg swelling. Negative for chest pain, orthopnea, claudication and PND. Gastrointestinal: Negative for blood in stool, heartburn and melena. Genitourinary: Negative for dysuria, frequency, hematuria and urgency. Musculoskeletal: Negative for back pain and joint pain. Skin: Negative for itching and rash. Neurological: Negative for dizziness and loss of consciousness. Psychiatric/Behavioral: Negative for depression and memory loss. Physical Exam  
Constitutional: She is oriented to person, place, and time. She appears well-developed and well-nourished. HENT:  
Head: Normocephalic and atraumatic. Eyes: Pupils are equal, round, and reactive to light. Conjunctivae are normal.  
Neck: Normal range of motion. Neck supple. No JVD present. Cardiovascular: Normal rate, regular rhythm, S1 normal and S2 normal.  No extrasystoles are present. PMI is not displaced. Exam reveals no gallop and no friction rub. Murmur heard. Harsh early systolic murmur is present with a grade of 2/6 at the upper right sternal border radiating to the neck. Pulses: 
     Carotid pulses are 3+ on the right side, and 3+ on the left side. Pulmonary/Chest: Effort normal. She has no rales. Abdominal: Soft. There is no tenderness. Musculoskeletal: She exhibits edema. Lt.arm lympedema Neurological: She is alert and oriented to person, place, and time. No cranial nerve deficit. Skin: Skin is warm and dry. Psychiatric: She has a normal mood and affect. Her behavior is normal.  
 
Visit Vitals /66 Pulse 60 Ht 5' 2\" (1.575 m) Wt 74.8 kg (165 lb) SpO2 96% BMI 30.18 kg/m² Past Medical History:  
Diagnosis Date  Aortic stenosis #23 bioprosthetic AVR 7/06  Atrial fibrillation ([facility] CHADS score 2  (-CHF, +HTN, -AGE, +DM, -CVA)  CAD in native artery   
 cor angio (Jan 2016): 50% pLAD, LCx luminal irreg, 30% mRCA. pLAD MLA 12.7 (IVUS)  Chronic diastolic HF (heart failure) ([facility] LV EF 55% (echo Nov 2015)  Complete heart block ([facility]) periprocedural 7/06  Diabetes  Dyslipidemia  Femoral arteriovenous fistula, right ([facility]) R side with initially ? pseudoaneurysm, none on last PVL (Feb 11, 2016 @ [facility])  History of echocardiogram 10/13/2015 EF 55%. No WMA. Mild LVH. Indeterminate diastolic function. Dyssynergic septal motion.  RVE. RV systolic fx reduced, more in apical region. RVSP 60-70 mmHg (prev 50 mmHg on study of 3/19/14). Mod LAE.  MIC. Mild MR. Bioprosthetic AV w/mild-mod AS and mild-mod AI (mean grad 28 mmHg). Mod TR. Mild-mod PI. Mod IVCE. Poor inspiratory collapse.  History of exercise stress test 02/15/2006 Nondiagnostic stress test due to inability to attain diagnostic HR. Ex time 1 min 43 sec.  Hypertension  Hypothyroidism  Malignant melanoma ([address]   
 initial '95, recurrent '04, L supraclavicullar/axillary resection '04,  Malignant melanoma ([address]   
 neck reconstruction, L rectus free tissue transfer '04  Malignant melanoma ([address] chemo, XRT  Obstructive sleep apnea   
 severe, CPAP  
 Pacemaker 7/06 [employer]  
 Pulmonary hypertension ([address]   
 cath (2006, prior to AVR): RA 20, RV 86/10, PA 83/33, PCWP 36. CO 3.3 (Zach)  Restrictive lung disease   
 moderate (PFTs 11/04)  S/P cardiac cath 03/06/2006 Patent coronary arteries. LVEDP 32 mmHg. EF 50-55%. No WMA. RA 20.  RV 86/10. PA 83/33. W 36.  CO 3.3 ([place]). Severe AS. Severe pulm hypertension.  SVT   
 periprocedural herniorrhaphy 10/04 Social History Socioeconomic History  Marital status:  Spouse name: Not on file  Number of children: Not on file  Years of education: Not on file  Highest education level: Not on file Occupational History  Not on file Social Needs  Financial resource strain: Not on file  Food insecurity:  
  Worry: Not on file Inability: Not on file  Transportation needs:  
  Medical: Not on file Non-medical: Not on file Tobacco Use  Smoking status: Never Smoker  Smokeless tobacco: Never Used Substance and Sexual Activity  Alcohol use: Yes Comment: rarely  Drug use: Not on file  Sexual activity: Not on file Lifestyle  Physical activity:  
  Days per week: Not on file Minutes per session: Not on file  Stress: Not on file Relationships  Social connections:  
  Talks on phone: Not on file Gets together: Not on file Attends Sabianist service: Not on file Active member of club or organization: Not on file Attends meetings of clubs or organizations: Not on file Relationship status: Not on file  Intimate partner violence:  
  Fear of current or ex partner: Not on file Emotionally abused: Not on file Physically abused: Not on file Forced sexual activity: Not on file Other Topics Concern  Not on file Social History Narrative  Not on file Family History Problem Relation Age of Onset  Elevated Lipids Mother  Heart Disease Mother  Diabetes Father  Heart Disease Father  Elevated Lipids Father  Hypertension Father  Cancer Father  Heart Disease Brother Past Surgical History:  
Procedure Laterality Date  HX AORTIC VALVE REPLACEMENT #23 pericardial 7/06  HX HERNIA REPAIR    
 10/04 ventral herniorrhaphy  HX PACEMAKER    
 7/06  DC RADICAL RESEC TUMOR, NECK/CHEST    
 melanoma resection Current Outpatient Medications Medication Sig Dispense Refill  allopurinol (ZYLOPRIM) 100 mg tablet Take 1 Tab by mouth daily. 30 Tab 10  carvedilol (COREG) 3.125 mg tablet Take 1 Tab by mouth two (2) times daily (with meals). 60 Tab 6  
 bumetanide (BUMEX) 2 mg tablet TAKE ONE & ONE-HALF TABLETS BY MOUTH TWICE DAILY 90 Tab 6  levothyroxine (SYNTHROID) 150 mcg tablet Take 1 Tab by mouth Daily (before breakfast). 30 Tab 11  
 sildenafil, antihypertensive, (REVATIO) 20 mg tablet Take 1 Tab by mouth three (3) times daily. 90 Tab 6  potassium chloride (K-DUR, KLOR-CON) 20 mEq tablet Take 3 Tabs by mouth daily. 90 Tab 6  
 glimepiride (AMARYL) 4 mg tablet TAKE 1 TABLET BY MOUTH ONCE DAILY 60 Tab 2  
 spironolactone (ALDACTONE) 25 mg tablet Take 1 Tab by mouth two (2) times a day. 180 Tab 3  
 metOLazone (ZAROXOLYN) 5 mg tablet TAKE ONE TABLET BY MOUTH TWICE DAILY 60 Tab 6  colchicine 0.6 mg tablet TAKE 1 TABLET BY MOUTH DAILY 30 Tab 6  
 atorvastatin (LIPITOR) 80 mg tablet Take 1 Tab by mouth daily. 30 Tab 6  
 magnesium oxide (MAG-OX) 400 mg tablet Take 1 Tab by mouth three (3) times daily. 270 Tab 3  
 aspirin delayed-release 81 mg tablet 81 mg.    
 omega-3 fatty acids-vitamin e (FISH OIL) 1,000 mg cap Take 3 Caps by mouth daily. 90 Cap 6  rivaroxaban (XARELTO) 15 mg tab tablet Take  by mouth daily.  fenofibrate nanocrystallized (TRICOR) 145 mg tablet Take 1 Tab by mouth daily. 30 Tab 6 EKG: unchanged from previous tracings, atrial fibrillation, rate controlled, 1:1 VVI pacing [place] ASSESSMENT and PLAN Encounter Diagnoses Name Primary?  S/P AVR 2006. Redo Left TF TAVR with a Medtronic CoreValve Evolut R 26 mm 2/16/16 Yes  Chronic atrial fibrillation ([address]  Pacemaker  Pulmonary hypertension ([address]  Dyslipidemia  Restrictive lung disease She has been doing well. She has had no symptoms to indicate angina or cardiac decompensation. Her bioprosthetic aortic valve appears to be functioning normally. Her oxygen saturation is adequate at 96% on room air. For now, she will be continued on current medical regimen and she will have follow up echocardiogram in six months.

## 2019-06-13 NOTE — PROGRESS NOTES
I have personally seen and evaluated the device findings. Interrogation reviewed and I agree with assessment.     Eran Ventura

## 2020-01-01 ENCOUNTER — CLINICAL SUPPORT (OUTPATIENT)
Dept: CARDIOLOGY CLINIC | Age: 60
End: 2020-01-01

## 2020-01-01 ENCOUNTER — HOSPITAL ENCOUNTER (EMERGENCY)
Age: 60
End: 2020-05-11
Attending: EMERGENCY MEDICINE
Payer: MEDICARE

## 2020-01-01 ENCOUNTER — OFFICE VISIT (OUTPATIENT)
Dept: CARDIOLOGY CLINIC | Age: 60
End: 2020-01-01

## 2020-01-01 ENCOUNTER — APPOINTMENT (OUTPATIENT)
Dept: GENERAL RADIOLOGY | Age: 60
End: 2020-01-01
Attending: EMERGENCY MEDICINE
Payer: MEDICARE

## 2020-01-01 VITALS
RESPIRATION RATE: 22 BRPM | OXYGEN SATURATION: 100 % | DIASTOLIC BLOOD PRESSURE: 40 MMHG | HEART RATE: 78 BPM | SYSTOLIC BLOOD PRESSURE: 143 MMHG

## 2020-01-01 VITALS
HEIGHT: 62 IN | BODY MASS INDEX: 29.08 KG/M2 | HEART RATE: 60 BPM | WEIGHT: 158 LBS | DIASTOLIC BLOOD PRESSURE: 66 MMHG | OXYGEN SATURATION: 95 % | SYSTOLIC BLOOD PRESSURE: 140 MMHG

## 2020-01-01 DIAGNOSIS — Z95.2 S/P AVR: Primary | ICD-10-CM

## 2020-01-01 DIAGNOSIS — Z95.0 PACEMAKER: ICD-10-CM

## 2020-01-01 DIAGNOSIS — J98.4 RESTRICTIVE LUNG DISEASE: ICD-10-CM

## 2020-01-01 DIAGNOSIS — I27.20 PULMONARY HYPERTENSION (HCC): ICD-10-CM

## 2020-01-01 DIAGNOSIS — I44.2 COMPLETE HEART BLOCK (HCC): Primary | ICD-10-CM

## 2020-01-01 DIAGNOSIS — E78.5 DYSLIPIDEMIA: ICD-10-CM

## 2020-01-01 DIAGNOSIS — I46.9 CARDIAC ARREST (HCC): Primary | ICD-10-CM

## 2020-01-01 DIAGNOSIS — I48.20 CHRONIC ATRIAL FIBRILLATION (HCC): ICD-10-CM

## 2020-01-01 LAB
ALBUMIN SERPL-MCNC: 1.7 G/DL (ref 3.4–5)
ALBUMIN/GLOB SERPL: 0.6 {RATIO} (ref 0.8–1.7)
ALP SERPL-CCNC: 363 U/L (ref 45–117)
ALT SERPL-CCNC: 40 U/L (ref 13–56)
ANION GAP SERPL CALC-SCNC: 16 MMOL/L (ref 3–18)
APTT PPP: 43.9 SEC (ref 23–36.4)
AST SERPL-CCNC: 54 U/L (ref 10–38)
BASOPHILS # BLD: 0.2 K/UL (ref 0–0.06)
BASOPHILS NFR BLD: 1 % (ref 0–3)
BILIRUB SERPL-MCNC: 0.8 MG/DL (ref 0.2–1)
BNP SERPL-MCNC: 5196 PG/ML (ref 0–900)
BUN SERPL-MCNC: 73 MG/DL (ref 7–18)
BUN/CREAT SERPL: 43 (ref 12–20)
CALCIUM SERPL-MCNC: 6 MG/DL (ref 8.5–10.1)
CHLORIDE SERPL-SCNC: 101 MMOL/L (ref 100–111)
CO2 SERPL-SCNC: 22 MMOL/L (ref 21–32)
CREAT SERPL-MCNC: 1.69 MG/DL (ref 0.6–1.3)
CRP SERPL-MCNC: <0.3 MG/DL (ref 0–0.3)
D DIMER PPP FEU-MCNC: 12.76 UG/ML(FEU)
DIFFERENTIAL METHOD BLD: ABNORMAL
EOSINOPHIL # BLD: 0 K/UL (ref 0–0.4)
EOSINOPHIL NFR BLD: 0 % (ref 0–5)
ERYTHROCYTE [DISTWIDTH] IN BLOOD BY AUTOMATED COUNT: 16.5 % (ref 11.6–14.5)
FERRITIN SERPL-MCNC: 222 NG/ML (ref 8–388)
GLOBULIN SER CALC-MCNC: 2.8 G/DL (ref 2–4)
GLUCOSE SERPL-MCNC: 336 MG/DL (ref 74–99)
HCT VFR BLD AUTO: 46.7 % (ref 35–45)
HGB BLD-MCNC: 15.4 G/DL (ref 12–16)
INR PPP: 1.1 (ref 0.8–1.2)
LDH SERPL L TO P-CCNC: 381 U/L (ref 81–234)
LIPASE SERPL-CCNC: 393 U/L (ref 73–393)
LYMPHOCYTES # BLD: 6.8 K/UL (ref 0.8–3.5)
LYMPHOCYTES NFR BLD: 32 % (ref 20–51)
MAGNESIUM SERPL-MCNC: 2 MG/DL (ref 1.6–2.6)
MCH RBC QN AUTO: 27.9 PG (ref 24–34)
MCHC RBC AUTO-ENTMCNC: 33 G/DL (ref 31–37)
MCV RBC AUTO: 84.6 FL (ref 74–97)
MONOCYTES # BLD: 0.4 K/UL (ref 0–1)
MONOCYTES NFR BLD: 2 % (ref 2–9)
NEUTS BAND NFR BLD MANUAL: 9 % (ref 0–5)
NEUTS SEG # BLD: 13.8 K/UL (ref 1.8–8)
NEUTS SEG NFR BLD: 56 % (ref 42–75)
PLATELET # BLD AUTO: 224 K/UL (ref 135–420)
PLATELET COMMENTS,PCOM: ABNORMAL
PMV BLD AUTO: 11.1 FL (ref 9.2–11.8)
POTASSIUM SERPL-SCNC: 3.1 MMOL/L (ref 3.5–5.5)
PROCALCITONIN SERPL-MCNC: 0.2 NG/ML
PROT SERPL-MCNC: 4.5 G/DL (ref 6.4–8.2)
PROTHROMBIN TIME: 14.2 SEC (ref 11.5–15.2)
RBC # BLD AUTO: 5.52 M/UL (ref 4.2–5.3)
RBC MORPH BLD: ABNORMAL
SODIUM SERPL-SCNC: 139 MMOL/L (ref 136–145)
TROPONIN I SERPL-MCNC: 0.43 NG/ML (ref 0–0.04)
WBC # BLD AUTO: 21.2 K/UL (ref 4.6–13.2)

## 2020-01-01 PROCEDURE — 85610 PROTHROMBIN TIME: CPT

## 2020-01-01 PROCEDURE — 84145 PROCALCITONIN (PCT): CPT

## 2020-01-01 PROCEDURE — 84484 ASSAY OF TROPONIN QUANT: CPT

## 2020-01-01 PROCEDURE — 92950 HEART/LUNG RESUSCITATION CPR: CPT

## 2020-01-01 PROCEDURE — 85730 THROMBOPLASTIN TIME PARTIAL: CPT

## 2020-01-01 PROCEDURE — 76010000138 HC OR TIME 0.5 TO 1 HR

## 2020-01-01 PROCEDURE — 76060000032 HC ANESTHESIA 0.5 TO 1 HR

## 2020-01-01 PROCEDURE — 86140 C-REACTIVE PROTEIN: CPT

## 2020-01-01 PROCEDURE — 83880 ASSAY OF NATRIURETIC PEPTIDE: CPT

## 2020-01-01 PROCEDURE — 99291 CRITICAL CARE FIRST HOUR: CPT

## 2020-01-01 PROCEDURE — 74011000250 HC RX REV CODE- 250: Performed by: EMERGENCY MEDICINE

## 2020-01-01 PROCEDURE — 83690 ASSAY OF LIPASE: CPT

## 2020-01-01 PROCEDURE — 93005 ELECTROCARDIOGRAM TRACING: CPT

## 2020-01-01 PROCEDURE — 94002 VENT MGMT INPAT INIT DAY: CPT

## 2020-01-01 PROCEDURE — 74011250636 HC RX REV CODE- 250/636: Performed by: EMERGENCY MEDICINE

## 2020-01-01 PROCEDURE — 80074 ACUTE HEPATITIS PANEL: CPT

## 2020-01-01 PROCEDURE — 82728 ASSAY OF FERRITIN: CPT

## 2020-01-01 PROCEDURE — 85025 COMPLETE CBC W/AUTO DIFF WBC: CPT

## 2020-01-01 PROCEDURE — 83735 ASSAY OF MAGNESIUM: CPT

## 2020-01-01 PROCEDURE — 80053 COMPREHEN METABOLIC PANEL: CPT

## 2020-01-01 PROCEDURE — 87389 HIV-1 AG W/HIV-1&-2 AB AG IA: CPT

## 2020-01-01 PROCEDURE — 83615 LACTATE (LD) (LDH) ENZYME: CPT

## 2020-01-01 PROCEDURE — 85379 FIBRIN DEGRADATION QUANT: CPT

## 2020-01-01 RX ORDER — NOREPINEPHRINE BITARTRATE/D5W 8 MG/250ML
PLASTIC BAG, INJECTION (ML) INTRAVENOUS
Status: DISCONTINUED
Start: 2020-01-01 | End: 2020-05-12 | Stop reason: HOSPADM

## 2020-01-01 RX ORDER — PHENYLEPHRINE HCL IN 0.9% NACL 30MG/250ML
10-100 PLASTIC BAG, INJECTION (ML) INTRAVENOUS
Status: DISCONTINUED | OUTPATIENT
Start: 2020-01-01 | End: 2020-05-13 | Stop reason: HOSPADM

## 2020-01-01 RX ORDER — SILDENAFIL CITRATE 20 MG/1
20 TABLET ORAL 3 TIMES DAILY
Qty: 90 TAB | Refills: 6 | Status: SHIPPED | OUTPATIENT
Start: 2020-01-01

## 2020-01-01 RX ORDER — FENTANYL CITRATE 50 UG/ML
INJECTION, SOLUTION INTRAMUSCULAR; INTRAVENOUS
Status: DISCONTINUED
Start: 2020-01-01 | End: 2020-05-12 | Stop reason: HOSPADM

## 2020-01-01 RX ORDER — BUMETANIDE 2 MG/1
TABLET ORAL
Qty: 90 TAB | Refills: 6 | Status: SHIPPED | OUTPATIENT
Start: 2020-01-01

## 2020-01-01 RX ORDER — EPINEPHRINE 1 MG/ML
INJECTION, SOLUTION, CONCENTRATE INTRAVENOUS
Status: DISCONTINUED
Start: 2020-01-01 | End: 2020-05-12 | Stop reason: HOSPADM

## 2020-01-01 RX ORDER — MIDAZOLAM HYDROCHLORIDE 1 MG/ML
INJECTION, SOLUTION INTRAMUSCULAR; INTRAVENOUS
Status: DISCONTINUED
Start: 2020-01-01 | End: 2020-05-12 | Stop reason: HOSPADM

## 2020-01-01 RX ORDER — EPINEPHRINE 0.1 MG/ML
INJECTION INTRACARDIAC; INTRAVENOUS
Status: COMPLETED | OUTPATIENT
Start: 2020-01-01 | End: 2020-01-01

## 2020-01-01 RX ORDER — VANCOMYCIN/0.9 % SOD CHLORIDE 1.5G/250ML
1500 PLASTIC BAG, INJECTION (ML) INTRAVENOUS ONCE
Status: DISCONTINUED | OUTPATIENT
Start: 2020-01-01 | End: 2020-05-12 | Stop reason: HOSPADM

## 2020-01-01 RX ORDER — LEVOTHYROXINE SODIUM 150 UG/1
150 TABLET ORAL
Qty: 90 TAB | Refills: 3 | Status: SHIPPED | OUTPATIENT
Start: 2020-01-01

## 2020-01-01 RX ORDER — SODIUM BICARBONATE 1 MEQ/ML
SYRINGE (ML) INTRAVENOUS
Status: COMPLETED | OUTPATIENT
Start: 2020-01-01 | End: 2020-01-01

## 2020-01-01 RX ORDER — FENTANYL CITRATE 50 UG/ML
INJECTION, SOLUTION INTRAMUSCULAR; INTRAVENOUS
Status: COMPLETED | OUTPATIENT
Start: 2020-01-01 | End: 2020-01-01

## 2020-01-01 RX ORDER — MIDAZOLAM HYDROCHLORIDE 1 MG/ML
INJECTION, SOLUTION INTRAMUSCULAR; INTRAVENOUS
Status: COMPLETED | OUTPATIENT
Start: 2020-01-01 | End: 2020-01-01

## 2020-01-01 RX ORDER — ALLOPURINOL 100 MG/1
TABLET ORAL
Qty: 90 TAB | Refills: 9 | Status: SHIPPED | OUTPATIENT
Start: 2020-01-01

## 2020-01-01 RX ORDER — POTASSIUM CHLORIDE 7.45 MG/ML
10 INJECTION INTRAVENOUS
Status: DISCONTINUED | OUTPATIENT
Start: 2020-01-01 | End: 2020-05-12 | Stop reason: HOSPADM

## 2020-01-01 RX ORDER — CALCIUM CHLORIDE INJECTION 100 MG/ML
INJECTION, SOLUTION INTRAVENOUS
Status: COMPLETED | OUTPATIENT
Start: 2020-01-01 | End: 2020-01-01

## 2020-01-01 RX ORDER — AMIODARONE HYDROCHLORIDE 150 MG/3ML
INJECTION, SOLUTION INTRAVENOUS
Status: COMPLETED | OUTPATIENT
Start: 2020-01-01 | End: 2020-01-01

## 2020-01-01 RX ORDER — PHENYLEPHRINE HYDROCHLORIDE 10 MG/ML
INJECTION INTRAVENOUS
Status: DISCONTINUED
Start: 2020-01-01 | End: 2020-05-12 | Stop reason: HOSPADM

## 2020-01-01 RX ADMIN — Medication 1 MG: at 20:52

## 2020-01-01 RX ADMIN — FENTANYL CITRATE 100 MCG: 50 INJECTION, SOLUTION INTRAMUSCULAR; INTRAVENOUS at 22:16

## 2020-01-01 RX ADMIN — MIDAZOLAM HYDROCHLORIDE 2 MG: 2 INJECTION, SOLUTION INTRAMUSCULAR; INTRAVENOUS at 22:17

## 2020-01-01 RX ADMIN — Medication 1 MG: at 20:59

## 2020-01-01 RX ADMIN — AMIODARONE HYDROCHLORIDE 150 MG: 50 INJECTION, SOLUTION INTRAVENOUS at 21:21

## 2020-01-01 RX ADMIN — Medication 1 MG: at 21:18

## 2020-01-01 RX ADMIN — CALCIUM CHLORIDE 1 G: 100 INJECTION INTRAVENOUS; INTRAVENTRICULAR at 20:56

## 2020-01-01 RX ADMIN — Medication 1 MG: at 21:25

## 2020-01-01 RX ADMIN — Medication 1 MG: at 21:14

## 2020-01-01 RX ADMIN — Medication 1 MG: at 21:06

## 2020-01-01 RX ADMIN — Medication 1 MG: at 21:39

## 2020-01-01 RX ADMIN — MIDAZOLAM HYDROCHLORIDE 2 MG: 2 INJECTION, SOLUTION INTRAMUSCULAR; INTRAVENOUS at 22:12

## 2020-01-01 RX ADMIN — CALCIUM CHLORIDE 1 G: 100 INJECTION INTRAVENOUS; INTRAVENTRICULAR at 21:19

## 2020-01-01 RX ADMIN — FENTANYL CITRATE 100 MCG: 50 INJECTION, SOLUTION INTRAMUSCULAR; INTRAVENOUS at 21:51

## 2020-01-01 RX ADMIN — FENTANYL CITRATE 100 MCG: 50 INJECTION, SOLUTION INTRAMUSCULAR; INTRAVENOUS at 22:09

## 2020-01-01 RX ADMIN — Medication 1 MG: at 21:22

## 2020-01-01 RX ADMIN — Medication 1 MG: at 21:11

## 2020-01-01 RX ADMIN — Medication 1 MG: at 21:35

## 2020-01-01 RX ADMIN — MIDAZOLAM HYDROCHLORIDE 3 MG: 2 INJECTION, SOLUTION INTRAMUSCULAR; INTRAVENOUS at 22:25

## 2020-01-01 RX ADMIN — FENTANYL CITRATE 100 MCG: 50 INJECTION, SOLUTION INTRAMUSCULAR; INTRAVENOUS at 22:26

## 2020-01-01 RX ADMIN — Medication 50 MEQ: at 20:54

## 2020-01-01 RX ADMIN — Medication 1 MG: at 21:02

## 2020-02-04 NOTE — PATIENT INSTRUCTIONS
If you have not heard from the central scheduler to schedule your testing in 48 hours, please call [phone].

## 2020-02-04 NOTE — PROGRESS NOTES
HISTORY OF PRESENT ILLNESS Cathlean Kawasaki is a 63 y.o. female. HPI She has been feeling well. Other than mild chronic dyspnea on exertion, she has no complaints. She has had no chest pain, resting dyspnea, orthopnea, PND. She has had no palpitation despite chronic atrial fibrillation other than occasional flutters which last only for a few seconds. She denies dizziness or syncope. She has had no symptoms to indicate TIA or amaurosis fugax. She has an extensive cardiac history. She had her first aortic valve replacement with the bioprosthetic aortic valve in July of 2006. 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 She subsequently underwent redo aortic valve replacement by TAVR successfully. Her repeat echocardiogram in June of 2016 demonstrated a normal LV functioning bioprosthetic aortic valve with a mean gradient of 16 mmHg and no evidence of significant paravalvular leak. She is known to have had diastolic dysfunction of the left ventricle, which was documented by cardiac catheterization with a high LVEDP. Her coronary artery system was patent with minor diffuse changes with no obstructive lesion by cardiac catheterization. She has developed severe pulmonary hypertension, which apparently preceded the aortic valve replacement. PA pressure was up to 90 mmHg in the past, which decreased to 50 mmHg on 06/28/16 by echocardiogram, at which time, the ejection fraction of the left ventricle was in the 60% range. The left atrium was moderately dilated and the right atrium was dilated as well. There was mild annular calcification of the mitral valve with mild regurgitation. The mean pressure gradient over the aortic valve was 17 mmHg. There was mild to moderate tricuspid regurgitation.  She has had chronic atrial fibrillation and had significant bradycardia and underwent pacemaker implantation in 2013 because of the complete heart block. She also has an extensive noncardiac history that includes malignant melanoma that was treated with surgery, chemotherapy and radiation therapy in 2004, which resulted in lymphedema in the left arm. She is known to have had restrictive lung disease of unknown etiology. She has had hypertension, diabetes and severe dyslipidemia with extremely high triglycerides, low HDL and high total cholesterol. Cholesterol level was 304 with LDL of 39 and triglycerides of 521 in May of 2016. She has some history of renal dysfunction with over-diuresis in the past. 
She had a followup echocardiogram on 02/21/2017, which demonstrated normal LV function with EF in the 75% range.  The left atrium was dilated with a volume index of 37 mL/meter2.  The bioprosthetic aortic valve appears to be functioning normally with the mean gradient of 14 mmHg.  There appears to be no significant perivalvular leak.  PA pressure was estimated at 49 mmHg.  
  
She has history of chemotherapy and extensive radiation treatment for melanoma, which may have resulted in the severe aortic stenosis in the beginning and perhaps some fibrotic lung disease as well along with the massive left arm lymphedema.   
She has metabolic syndrome with high triglyceride and low HDL. Thus far, she has had no symptoms to indicate angina. Previous catheterization demonstrated patent coronary arteries thus far. Reptha was considered but not recommended since her direct LDL measurement was below 40. Review of Systems Constitutional: Negative for malaise/fatigue and weight loss. HENT: Negative for hearing loss. Eyes: Negative for blurred vision and double vision. Respiratory: Negative for shortness of breath. Cardiovascular: Negative for chest pain, palpitations, orthopnea, claudication, leg swelling and PND. Gastrointestinal: Negative for blood in stool, heartburn and melena. Genitourinary: Negative for dysuria, frequency, hematuria and urgency. Musculoskeletal: Negative for back pain and joint pain. Skin: Negative for itching and rash. Neurological: Negative for dizziness and loss of consciousness. Psychiatric/Behavioral: Negative for depression and memory loss. Physical Exam  
Constitutional: She is oriented to person, place, and time. She appears well-developed and well-nourished. HENT:  
Head: Normocephalic and atraumatic. Eyes: Pupils are equal, round, and reactive to light. Conjunctivae are normal.  
Neck: Normal range of motion. Neck supple. No JVD present. Cardiovascular: Normal rate, S1 normal and S2 normal. An irregularly irregular rhythm present. No extrasystoles are present. PMI is not displaced. Exam reveals no gallop and no friction rub. Murmur heard. Harsh early systolic murmur is present with a grade of 2/6 at the upper right sternal border radiating to the neck. Pulses: 
     Carotid pulses are 3+ on the right side and 3+ on the left side. Pulmonary/Chest: Effort normal. She has no rales. Abdominal: Soft. There is no abdominal tenderness. Musculoskeletal:     
   General: Edema present. Comments: Lt.arm lymphedema Neurological: She is alert and oriented to person, place, and time. No cranial nerve deficit. Skin: Skin is warm and dry. Psychiatric: She has a normal mood and affect. Her behavior is normal.  
 
Visit Vitals /66 Pulse 60 Ht 5' 2\" (1.575 m) Wt 71.7 kg (158 lb) SpO2 95% BMI 28.90 kg/m² Past Medical History:  
Diagnosis Date  Aortic stenosis #23 bioprosthetic AVR 7/06  Atrial fibrillation ([facility] CHADS score 2  (-CHF, +HTN, -AGE, +DM, -CVA)  CAD in native artery   
 cor angio (Jan 2016): 50% pLAD, LCx luminal irreg, 30% mRCA. pLAD MLA 12.7 (IVUS)  Chronic diastolic HF (heart failure) ([facility] LV EF 55% (echo Nov 2015)  Complete heart block (HCC) periprocedural 7/06  Diabetes  Dyslipidemia  Femoral arteriovenous fistula, right (HCC) R side with initially ? pseudoaneurysm, none on last PVL (Feb 11, 2016 @ [facility])  History of echocardiogram 10/13/2015 EF 55%. No WMA. Mild LVH. Indeterminate diastolic function. Dyssynergic septal motion.  RVE. RV systolic fx reduced, more in apical region. RVSP 60-70 mmHg (prev 50 mmHg on study of 3/19/14). Mod LAE.  MIC. Mild MR. Bioprosthetic AV w/mild-mod AS and mild-mod AI (mean grad 28 mmHg). Mod TR. Mild-mod PI. Mod IVCE. Poor inspiratory collapse.  History of exercise stress test 02/15/2006 Nondiagnostic stress test due to inability to attain diagnostic HR. Ex time 1 min 43 sec.  Hypertension  Hypothyroidism  Malignant melanoma ([address]   
 initial '95, recurrent '04, L supraclavicullar/axillary resection '04,  Malignant melanoma ([address]   
 neck reconstruction, L rectus free tissue transfer '04  Malignant melanoma ([address] chemo, XRT  Obstructive sleep apnea   
 severe, CPAP  
 Pacemaker 7/06 [employer]TRONIC  
 Pulmonary hypertension ([address]   
 cath (2006, prior to AVR): RA 20, RV 86/10, PA 83/33, PCWP 36. CO 3.3 (Zach)  Restrictive lung disease   
 moderate (PFTs 11/04)  S/P cardiac cath 03/06/2006 Patent coronary arteries. LVEDP 32 mmHg. EF 50-55%. No WMA. RA 20.  RV 86/10. PA 83/33. W 36.  CO 3.3 ([facility]). Severe AS. Severe pulm hypertension.  SVT   
 periprocedural herniorrhaphy 10/04 Social History Socioeconomic History  Marital status:  Spouse name: Not on file  Number of children: Not on file  Years of education: Not on file  Highest education level: Not on file Occupational History  Not on file Social Needs  Financial resource strain: Not on file  Food insecurity:  
  Worry: Not on file Inability: Not on file  Transportation needs:  
  Medical: Not on file Non-medical: Not on file Tobacco Use  Smoking status: Never Smoker  Smokeless tobacco: Never Used Substance and Sexual Activity  Alcohol use: Yes Comment: rarely  Drug use: Not on file  Sexual activity: Not on file Lifestyle  Physical activity:  
  Days per week: Not on file Minutes per session: Not on file  Stress: Not on file Relationships  Social connections:  
  Talks on phone: Not on file Gets together: Not on file Attends Anabaptism service: Not on file Active member of club or organization: Not on file Attends meetings of clubs or organizations: Not on file Relationship status: Not on file  Intimate partner violence:  
  Fear of current or ex partner: Not on file Emotionally abused: Not on file Physically abused: Not on file Forced sexual activity: Not on file Other Topics Concern  Not on file Social History Narrative  Not on file Family History Problem Relation Age of Onset  Elevated Lipids Mother  Heart Disease Mother  Diabetes Father  Heart Disease Father  Elevated Lipids Father  Hypertension Father  Cancer Father  Heart Disease Brother Past Surgical History:  
Procedure Laterality Date  HX AORTIC VALVE REPLACEMENT #23 pericardial 7/06  HX HERNIA REPAIR    
 10/04 ventral herniorrhaphy  HX PACEMAKER    
 7/06  [place] RADICAL RESEC TUMOR, NECK/CHEST    
 melanoma resection Current Outpatient Medications Medication Sig Dispense Refill  allopurinoL (ZYLOPRIM) 100 mg tablet TAKE ONE TABLET BY MOUTH DAILY 90 Tab 9  carvedilol (COREG) 3.125 mg tablet Take 1 Tab by mouth two (2) times daily (with meals). 60 Tab 6  potassium chloride (K-DUR, KLOR-CON) 20 mEq tablet Take 3 Tabs by mouth daily. 90 Tab 6  
 spironolactone (ALDACTONE) 25 mg tablet Take 1 Tab by mouth two (2) times a day.  180 Tab 3  
  metOLazone (ZAROXOLYN) 5 mg tablet TAKE ONE TABLET BY MOUTH TWICE DAILY 60 Tab 6  
 glimepiride (AMARYL) 4 mg tablet Take 1 Tab by mouth daily. 60 Tab 7  
 atorvastatin (LIPITOR) 80 mg tablet Take 1 Tab by mouth daily. 30 Tab 6  
 magnesium oxide (MAG-OX) 400 mg tablet Take 1 Tab by mouth three (3) times daily. 180 Tab 4  
 bumetanide (BUMEX) 2 mg tablet TAKE ONE & ONE-HALF TABLETS BY MOUTH TWICE DAILY 90 Tab 6  levothyroxine (SYNTHROID) 150 mcg tablet Take 1 Tab by mouth Daily (before breakfast). 30 Tab 11  
 sildenafil, antihypertensive, (REVATIO) 20 mg tablet Take 1 Tab by mouth three (3) times daily. 90 Tab 6  colchicine 0.6 mg tablet TAKE 1 TABLET BY MOUTH DAILY 30 Tab 6  
 aspirin delayed-release 81 mg tablet 81 mg.    
 omega-3 fatty acids-vitamin e (FISH OIL) 1,000 mg cap Take 3 Caps by mouth daily. 90 Cap 6  rivaroxaban (XARELTO) 15 mg tab tablet Take  by mouth daily.  fenofibrate nanocrystallized (TRICOR) 145 mg tablet Take 1 Tab by mouth daily. 30 Tab 6 EKG: unchanged from previous tracings, atrial fibrillation, rate controlled, 1:1 VVI pacing Yannick Michaud ASSESSMENT and PLAN Encounter Diagnoses Name Primary?  S/P AVR 2006. Redo Left TF TAVR with a Medtronic CoreValve Evolut R 26 mm 2/16/16 Yes  Chronic atrial fibrillation  Pacemaker  Pulmonary hypertension ([address]  Dyslipidemia  Restrictive lung disease She has been doing well. She has had no symptoms of angina or cardiac decompensation. Her dyspnea on exertion has been mild and unchanged. Her bioprosthetic aortic valve appears to be functioning normally. She shows no signs of right heart failure and the pulmonary hypertension does not seem to have gotten worse.  For now, she will be continued on the current medical regimen and she will be scheduled for follow up echocardiogram in six months before she sees Dr. Marlene Miles who will be taking over her cardiac care in June 2020 upon my FPC. She will be scheduled at [facility] office where he daughter works.

## 2020-05-12 LAB
ATRIAL RATE: 64 BPM
CALCULATED R AXIS, ECG10: 148 DEGREES
CALCULATED T AXIS, ECG11: 58 DEGREES
DIAGNOSIS, 93000: NORMAL
HAV IGM SER QL: NEGATIVE
HBV CORE IGM SER QL: NEGATIVE
HBV SURFACE AG SER QL: 0.2 INDEX
HBV SURFACE AG SER QL: NEGATIVE
HCV AB SER IA-ACNC: <0.02 INDEX
HCV AB SERPL QL IA: NEGATIVE
HCV COMMENT,HCGAC: NORMAL
P-R INTERVAL, ECG05: 496 MS
Q-T INTERVAL, ECG07: 432 MS
QRS DURATION, ECG06: 146 MS
QTC CALCULATION (BEZET), ECG08: 445 MS
SP1: NORMAL
SP2: NORMAL
SP3: NORMAL
VENTRICULAR RATE, ECG03: 64 BPM

## 2020-05-12 PROCEDURE — 76060000032 HC ANESTHESIA 0.5 TO 1 HR

## 2020-05-12 PROCEDURE — 76010000138 HC OR TIME 0.5 TO 1 HR

## 2020-05-12 NOTE — PROGRESS NOTES
responded to Death of  Robert Ellison, who was a 63 y.o.,female, The  provided the following Interventions: 
Provided crisis pastoral care, pastoral support and grief interventions. Offered prayers on behalf of the patient. Chart reviewed. Plan: 
Chaplains will continue to follow and will provide pastoral care on an as needed/requested basis and grief support for the family.  [facility]  
[phone]

## 2020-05-12 NOTE — ED TRIAGE NOTES
Pt arrived by EMS from home. According to pts  pt became short of breath and collapsed. Pts  immediately began [address]. Pt came in with ROSC with VFIB. intubated with 7.5 and 25 at the teeth. Pt was on a epi drip at 5mcg/min and amiodorone at 150. Pt vitals per the life pack were 48 pulse, 98% intubated, 29 CO2, 16 respirations, and a blood sugar of 206 per EMS.

## 2020-05-12 NOTE — PROGRESS NOTES
responded to [place] for  Truist, who is a 63 y.o.,female, The  provided the following Interventions: 
Provided crisis pastoral care and pastoral support. Offered prayers on behalf for the patient. Chart reviewed. The following outcomes were achieved: 
 
 
Assessment: 
There are no spiritual or Adventist issues which require intervention at this time. Plan: 
Chaplains will continue to follow and will provide pastoral care on an as needed/requested basis.  recommends bedside caregivers page  on duty if patient shows signs of acute spiritual or emotional distress.  Melany Tompkins [facility]  
[phone]

## 2020-05-12 NOTE — ED NOTES
Pt transported to [address] [place] [address] at [place] [id number]. Going to call pt  to  valuables.

## 2020-05-13 LAB
HIV 1+2 AB+HIV1 P24 AG SERPL QL IA: NONREACTIVE
HIV12 RESULT COMMENT, HHIVC: NORMAL

## 2020-05-15 NOTE — ED PROVIDER NOTES
EMERGENCY DEPARTMENT HISTORY AND PHYSICAL EXAM 
 
3:42 PM 
Date: 5/11/2020 Patient Name: Dez Yeboah History of Presenting Illness Chief Complaint Patient presents with  Cardiac arrest  
  
 
History Provided By: EMS 
 
HPI: Dez Yeboah is a 63 y.o. female with history of multiple medical problems as below. Patient was brought in by ambulance after cardiac arrest.  According to the  he went to grab her some water and when he came back he found her on the ground so he started CPR immediately. When EMS got there the patient was in V. fib and she got shocked a few times and achieved ROSC and was intubated in the field. She was also on an epi and amiodarone drip. Location: 
Severity: 
Timing/course: Onset/Duration: PCP: Joey Howard MD 
 
Past History Past Medical History: 
Past Medical History:  
Diagnosis Date  Aortic stenosis #23 bioprosthetic AVR 7/06  Atrial fibrillation ([facility] CHADS score 2  (-CHF, +HTN, -AGE, +DM, -CVA)  CAD in native artery   
 cor angio (Jan 2016): 50% pLAD, LCx luminal irreg, 30% mRCA. pLAD MLA 12.7 (IVUS)  Chronic diastolic HF (heart failure) ([facility] LV EF 55% (echo Nov 2015)  Complete heart block (HCC) periprocedural 7/06  Diabetes  Dyslipidemia  Femoral arteriovenous fistula, right (HCC) R side with initially ? pseudoaneurysm, none on last PVL (Feb 11, 2016 @ [facility])  History of echocardiogram 10/13/2015 EF 55%. No WMA. Mild LVH. Indeterminate diastolic function. Dyssynergic septal motion.  RVE. RV systolic fx reduced, more in apical region. RVSP 60-70 mmHg (prev 50 mmHg on study of 3/19/14). Mod LAE.  MIC. Mild MR. Bioprosthetic AV w/mild-mod AS and mild-mod AI (mean grad 28 mmHg). Mod TR. Mild-mod PI. Mod IVCE. Poor inspiratory collapse.  History of exercise stress test 02/15/2006 Nondiagnostic stress test due to inability to attain diagnostic HR.   Ex time 1 min 43 sec.  Hypertension  Hypothyroidism  Malignant melanoma ([address]   
 initial '95, recurrent '04, L supraclavicullar/axillary resection '04,  Malignant melanoma ([address]   
 neck reconstruction, L rectus free tissue transfer '04  Malignant melanoma ([address] chemo, XRT  Obstructive sleep apnea   
 severe, CPAP  
 Pacemaker 7/06 [employer]  
 Pulmonary hypertension ([address]   
 cath (2006, prior to AVR): RA 20, RV 86/10, PA 83/33, PCWP 36. CO 3.3 (Zach)  Restrictive lung disease   
 moderate (PFTs 11/04)  S/P cardiac cath 03/06/2006 Patent coronary arteries. LVEDP 32 mmHg. EF 50-55%. No WMA. RA 20.  RV 86/10. PA 83/33. W 36.  CO 3.3 (Candice Knock). Severe AS. Severe pulm hypertension.  SVT   
 periprocedural herniorrhaphy 10/04 Past Surgical History: 
Past Surgical History:  
Procedure Laterality Date  HX AORTIC VALVE REPLACEMENT #23 pericardial 7/06  HX HERNIA REPAIR    
 10/04 ventral herniorrhaphy  HX PACEMAKER    
 7/06  [place] RADICAL RESEC TUMOR, NECK/CHEST    
 melanoma resection Family History: 
Family History Problem Relation Age of Onset  Elevated Lipids Mother  Heart Disease Mother  Diabetes Father  Heart Disease Father  Elevated Lipids Father  Hypertension Father  Cancer Father  Heart Disease Brother Social History: 
Social History Tobacco Use  Smoking status: Never Smoker  Smokeless tobacco: Never Used Substance Use Topics  Alcohol use: Yes Comment: rarely  Drug use: Not on file Allergies: Allergies Allergen Reactions  Adhesive Itching  Pradaxa [Dabigatran Etexilate] Other (comments) GI distress Review of Systems Review of Systems Unable to perform ROS: Acuity of condition Physical Exam  
 
No data found. Physical Exam 
Constitutional: Interventions: She is intubated. Neck: Musculoskeletal: Neck supple. Cardiovascular: Rate and Rhythm: Tachycardia present. Pulmonary:  
   Effort: Respiratory distress present. She is intubated. Musculoskeletal:  
   Comments: Left arm swelling and deformity at baseline Skin: 
   General: Skin is dry. Neurological:  
   Mental Status: She is unresponsive. Diagnostic Study Results Labs - No results found for this or any previous visit (from the past 12 hour(s)). Radiologic Studies - No results found. Medical Decision Making ED Course: Progress Notes, Reevaluation, and Consults: 
 
3:42 PM Initial assessment performed. The patients presenting problems have been discussed, and they/their family are in agreement with the care plan formulated and outlined with them. I have encouraged them to ask questions as they arise throughout their visit. Provider Notes (Medical Decision Making): 64-year-old female brought in after cardiac arrest with successful regain of circulation intubated in the field. Shortly upon arrival the patient went into cardiac arrest again was PEA and we were able to get Rosc, however the patient was intermittently going to pulseless V. fib and was shocked about 5-6 times and was on epi drip and was given pushes of epi bicarb and calcium. She was having a good end-tidal and oxygenating during the CPR so it was effective and I had called cardiology immediately when the patient got here after obtaining an EKG since she was V. fib arrest and spoke to Dr. Ludwig Fothergill. However that point patient had lost pulses again so we had to do CPR and shocked her, spoke to Dr. Ludwig Fothergill again after she finally had return of circulation however we both agree since it has been a long time and prolonged CPR that the outcomes are likely poor. Total time of CPR lasted about 15 minutes or so.   After that I had discussed this with her family members her  and 2 children and they verbalized to me that they do not want her to be resuscitated anymore and they would prefer if she is more comfortable without further CPR as she had been through a lot. With her  at bedside she was extubated and started on comfort measures. Shortly after she went into asystole and became bradycardic and hypoxic. Time of death was pronounced at 10:29 PM. Procedures:  
 
Critical Care Time: Upon my evaluation, this patient had a high probability of imminent or life-threatening deterioration due to cardiac arrest, which required my direct attention, intervention, and personal management. I have personally provided 50 minutes of critical care time exclusive of time spent on separately billable procedures. Time includes review of laboratory data, radiology results, discussion with consultants, and monitoring for potential decompensation. Interventions were performed as documented above. No att. providers found 3:53 PM 
 
 
 
Vital Signs-Reviewed the patient's vital signs. Reviewed pt's pulse ox reading. EKG: Interpreted by the EP. Time Interpreted:  
 Rate:  
 Rhythm:  
 Interpretation: 
 Comparison:  
 
Records Reviewed: Nursing Notes (Time of Review: 3:42 PM) 
-I am the first provider for this patient. 
-I reviewed the vital signs, available nursing notes, past medical history, past surgical history, family history and social history. Current Outpatient Medications Medication Sig Dispense Refill  levothyroxine (SYNTHROID) 150 mcg tablet Take 1 Tab by mouth Daily (before breakfast). 90 Tab 3  
 bumetanide (BUMEX) 2 mg tablet TAKE ONE & ONE-HALF TABLETS BY MOUTH TWICE DAILY 90 Tab 6  
 sildenafil, pulm. hypertension, (REVATIO) 20 mg tablet Take 1 Tab by mouth three (3) times daily. 90 Tab 6  
 allopurinoL (ZYLOPRIM) 100 mg tablet TAKE ONE TABLET BY MOUTH DAILY 90 Tab 9  carvedilol (COREG) 3.125 mg tablet Take 1 Tab by mouth two (2) times daily (with meals). 60 Tab 6  potassium chloride (K-DUR, KLOR-CON) 20 mEq tablet Take 3 Tabs by mouth daily. 90 Tab 6  
 spironolactone (ALDACTONE) 25 mg tablet Take 1 Tab by mouth two (2) times a day. 180 Tab 3  
 metOLazone (ZAROXOLYN) 5 mg tablet TAKE ONE TABLET BY MOUTH TWICE DAILY 60 Tab 6  
 glimepiride (AMARYL) 4 mg tablet Take 1 Tab by mouth daily. 60 Tab 7  
 atorvastatin (LIPITOR) 80 mg tablet Take 1 Tab by mouth daily. 30 Tab 6  
 magnesium oxide (MAG-OX) 400 mg tablet Take 1 Tab by mouth three (3) times daily. 180 Tab 4  
 fenofibrate nanocrystallized (TRICOR) 145 mg tablet Take 1 Tab by mouth daily. 30 Tab 6  colchicine 0.6 mg tablet TAKE 1 TABLET BY MOUTH DAILY 30 Tab 6  
 aspirin delayed-release 81 mg tablet 81 mg.    
 omega-3 fatty acids-vitamin e (FISH OIL) 1,000 mg cap Take 3 Caps by mouth daily. 90 Cap 6  rivaroxaban (XARELTO) 15 mg tab tablet Take  by mouth daily. Clinical Impression Clinical Impression: 1. Cardiac arrest ([facility] Disposition:  This note was dictated utilizing voice recognition software which may lead to typographical errors. I apologize in advance if the situation occurs. If questions arise please do not hesitate to contact me or call our department.  
 
Austin Santos MD 
3:42 PM

## 2023-02-06 NOTE — TELEPHONE ENCOUNTER
----- Message from Adelia Armenta MD sent at 11/26/2018  4:09 PM EST -----  Increase Fish oil to 8 caps a day and start Tricor then repeat Lipid profile/LFT in 3 months.
LM on Am for patient to call back
Letter sent
Lm on AM for patient to call back
Quality 226: Preventive Care And Screening: Tobacco Use: Screening And Cessation Intervention: Patient screened for tobacco use and is an ex/non-smoker
Detail Level: Detailed
Quality 431: Preventive Care And Screening: Unhealthy Alcohol Use - Screening: Patient not identified as an unhealthy alcohol user when screened for unhealthy alcohol use using a systematic screening method
Quality 130: Documentation Of Current Medications In The Medical Record: Current Medications Documented

## (undated) DEVICE — REM POLYHESIVE (TM) ADULT CORDLESS PATIENT RETURN ELECTRODE: Brand: VALLEYLAB

## (undated) DEVICE — STERILE POLYISOPRENE POWDER-FREE SURGICAL GLOVES: Brand: PROTEXIS